# Patient Record
Sex: MALE | Race: WHITE | Employment: FULL TIME | URBAN - METROPOLITAN AREA
[De-identification: names, ages, dates, MRNs, and addresses within clinical notes are randomized per-mention and may not be internally consistent; named-entity substitution may affect disease eponyms.]

---

## 2017-08-18 ENCOUNTER — OFFICE VISIT (OUTPATIENT)
Dept: FAMILY MEDICINE CLINIC | Age: 42
End: 2017-08-18

## 2017-08-18 VITALS
HEART RATE: 76 BPM | DIASTOLIC BLOOD PRESSURE: 78 MMHG | BODY MASS INDEX: 34.56 KG/M2 | SYSTOLIC BLOOD PRESSURE: 106 MMHG | TEMPERATURE: 97.2 F | WEIGHT: 228 LBS | RESPIRATION RATE: 16 BRPM | HEIGHT: 68 IN

## 2017-08-18 DIAGNOSIS — E04.9 GOITER: ICD-10-CM

## 2017-08-18 DIAGNOSIS — Z00.00 HEALTHCARE MAINTENANCE: Primary | ICD-10-CM

## 2017-08-18 PROCEDURE — 99396 PREV VISIT EST AGE 40-64: CPT | Performed by: FAMILY MEDICINE

## 2017-08-18 ASSESSMENT — PATIENT HEALTH QUESTIONNAIRE - PHQ9
SUM OF ALL RESPONSES TO PHQ QUESTIONS 1-9: 0
1. LITTLE INTEREST OR PLEASURE IN DOING THINGS: 0
2. FEELING DOWN, DEPRESSED OR HOPELESS: 0
SUM OF ALL RESPONSES TO PHQ9 QUESTIONS 1 & 2: 0

## 2017-09-25 DIAGNOSIS — J02.9 ACUTE PHARYNGITIS, UNSPECIFIED ETIOLOGY: ICD-10-CM

## 2017-09-27 DIAGNOSIS — J02.0 STREP PHARYNGITIS: Primary | ICD-10-CM

## 2017-09-27 LAB
ORGANISM: ABNORMAL
THROAT CULTURE: ABNORMAL
THROAT CULTURE: ABNORMAL

## 2017-09-27 RX ORDER — AMOXICILLIN AND CLAVULANATE POTASSIUM 875; 125 MG/1; MG/1
1 TABLET, FILM COATED ORAL 2 TIMES DAILY
Qty: 14 TABLET | Refills: 0 | Status: SHIPPED | OUTPATIENT
Start: 2017-09-27 | End: 2017-10-04

## 2017-10-17 ENCOUNTER — HOSPITAL ENCOUNTER (OUTPATIENT)
Dept: ULTRASOUND IMAGING | Age: 42
Discharge: HOME | End: 2017-10-17
Payer: COMMERCIAL

## 2017-10-17 DIAGNOSIS — E04.9 GOITER: ICD-10-CM

## 2017-10-17 PROCEDURE — 76536 US EXAM OF HEAD AND NECK: CPT

## 2018-07-10 ENCOUNTER — OFFICE VISIT (OUTPATIENT)
Dept: FAMILY MEDICINE CLINIC | Age: 43
End: 2018-07-10
Payer: COMMERCIAL

## 2018-07-10 VITALS
OXYGEN SATURATION: 97 % | BODY MASS INDEX: 35.88 KG/M2 | TEMPERATURE: 96.5 F | DIASTOLIC BLOOD PRESSURE: 78 MMHG | HEART RATE: 74 BPM | SYSTOLIC BLOOD PRESSURE: 106 MMHG | WEIGHT: 236 LBS

## 2018-07-10 DIAGNOSIS — L50.9 URTICARIA: Primary | ICD-10-CM

## 2018-07-10 PROCEDURE — 1036F TOBACCO NON-USER: CPT | Performed by: NURSE PRACTITIONER

## 2018-07-10 PROCEDURE — G8417 CALC BMI ABV UP PARAM F/U: HCPCS | Performed by: NURSE PRACTITIONER

## 2018-07-10 PROCEDURE — G8427 DOCREV CUR MEDS BY ELIG CLIN: HCPCS | Performed by: NURSE PRACTITIONER

## 2018-07-10 PROCEDURE — 99213 OFFICE O/P EST LOW 20 MIN: CPT | Performed by: NURSE PRACTITIONER

## 2018-07-10 RX ORDER — PREDNISONE 20 MG/1
40 TABLET ORAL DAILY
Qty: 10 TABLET | Refills: 0 | Status: SHIPPED | OUTPATIENT
Start: 2018-07-10 | End: 2018-07-15

## 2018-07-10 ASSESSMENT — ENCOUNTER SYMPTOMS
DIARRHEA: 0
RHINORRHEA: 0
VOMITING: 0
COUGH: 0

## 2018-07-10 NOTE — PROGRESS NOTES
Subjective:      Patient ID: Ramon Lei is a 36 y.o. male who presents today with a complaint of   Chief Complaint   Patient presents with    Rash     x 2 months Pt states that he has a rash that is coming and going he states that it is on his arms legs and ankles. Pt has tried Benadryll with no relief. For 2 months has had itchy red blotches that come off and on. Worse in morning and at night. Goes away during the day. Benadryl did not help. Rash   This is a new problem. Episode onset: 2 months  The problem has been waxing and waning since onset. Location: arms and ankles  The rash is characterized by itchiness and redness. Pertinent negatives include no congestion, cough, diarrhea, fatigue, fever, rhinorrhea or vomiting. Treatments tried: benadryl        Past Medical History:   Diagnosis Date    Abdominal pain     Allergic rhinitis     Enterocolitis     Obstructive sleep apnea      No past surgical history on file. Family History   Problem Relation Age of Onset    Thyroid Cancer Mother     Diabetes Other     Alzheimer's Disease Other      Social History     Social History    Marital status:      Spouse name: N/A    Number of children: N/A    Years of education: N/A     Occupational History    Not on file. Social History Main Topics    Smoking status: Never Smoker    Smokeless tobacco: Never Used    Alcohol use Yes      Comment: occasional    Drug use: No    Sexual activity: Not on file     Other Topics Concern    Not on file     Social History Narrative    No narrative on file       Allergies:  Patient has no known allergies. Review of Systems   Constitutional: Negative for fatigue and fever. HENT: Negative for congestion and rhinorrhea. Respiratory: Negative for cough. Gastrointestinal: Negative for diarrhea and vomiting. Skin: Positive for rash.          Objective:   /78 (Site: Right Arm, Position: Sitting, Cuff Size: Medium Adult)   Pulse 74   Temp 96.5 °F (35.8 °C) (Temporal)   Wt 236 lb (107 kg)   SpO2 97%   BMI 35.88 kg/m²   Physical Exam   Constitutional: He appears well-developed and well-nourished. No distress. Cardiovascular: Normal rate, regular rhythm and normal heart sounds. No murmur heard. Pulmonary/Chest: Effort normal and breath sounds normal. No respiratory distress. Skin: No rash noted. He is not diaphoretic. There is currently no rash at time of exam      No results found for this visit on 07/10/18. Assessment:       Diagnosis Orders   1. Urticaria  predniSONE (DELTASONE) 20 MG tablet           Plan:      No orders of the defined types were placed in this encounter. Orders Placed This Encounter   Medications    predniSONE (DELTASONE) 20 MG tablet     Sig: Take 2 tablets by mouth daily for 5 days     Dispense:  10 tablet     Refill:  0     Patient does not currently have the rash and did not take pictures at home. Based on description, sounds like urticaria. Advised that something in his environment is causing this reaction. Will try short steroid course. Advised to keep notes at home of when rash occurs and what he was doing around that time. Advised to take photos. Follow up with PCP. Patient verbalized understanding. Return if symptoms worsen or fail to improve.     MANAV Whitman - CNP

## 2019-05-06 ENCOUNTER — TELEPHONE (OUTPATIENT)
Dept: FAMILY MEDICINE CLINIC | Age: 44
End: 2019-05-06

## 2019-06-16 ENCOUNTER — APPOINTMENT (OUTPATIENT)
Dept: CT IMAGING | Age: 44
End: 2019-06-16
Payer: COMMERCIAL

## 2019-06-16 ENCOUNTER — HOSPITAL ENCOUNTER (EMERGENCY)
Age: 44
Discharge: HOME | End: 2019-06-16
Payer: COMMERCIAL

## 2019-06-16 VITALS
HEART RATE: 71 BPM | DIASTOLIC BLOOD PRESSURE: 93 MMHG | OXYGEN SATURATION: 97 % | WEIGHT: 230 LBS | SYSTOLIC BLOOD PRESSURE: 138 MMHG | BODY MASS INDEX: 34.86 KG/M2 | TEMPERATURE: 97.9 F | RESPIRATION RATE: 16 BRPM | HEIGHT: 68 IN

## 2019-06-16 DIAGNOSIS — N20.0 NEPHROLITHIASIS: Primary | ICD-10-CM

## 2019-06-16 LAB
ALBUMIN SERPL-MCNC: 4.5 G/DL (ref 3.5–4.6)
ALP BLD-CCNC: 46 U/L (ref 35–104)
ALT SERPL-CCNC: 36 U/L (ref 0–41)
ANION GAP SERPL CALCULATED.3IONS-SCNC: 10 MEQ/L (ref 9–15)
AST SERPL-CCNC: 27 U/L (ref 0–40)
BACTERIA: NEGATIVE /HPF
BASOPHILS ABSOLUTE: 0.1 K/UL (ref 0–0.2)
BASOPHILS RELATIVE PERCENT: 0.9 %
BILIRUB SERPL-MCNC: 0.4 MG/DL (ref 0.2–0.7)
BILIRUBIN URINE: NEGATIVE
BLOOD, URINE: ABNORMAL
BUN BLDV-MCNC: 13 MG/DL (ref 6–20)
CALCIUM SERPL-MCNC: 9.7 MG/DL (ref 8.5–9.9)
CHLORIDE BLD-SCNC: 101 MEQ/L (ref 95–107)
CLARITY: CLEAR
CO2: 27 MEQ/L (ref 20–31)
COLOR: YELLOW
CREAT SERPL-MCNC: 1 MG/DL (ref 0.7–1.2)
EOSINOPHILS ABSOLUTE: 0.2 K/UL (ref 0–0.7)
EOSINOPHILS RELATIVE PERCENT: 2.2 %
EPITHELIAL CELLS, UA: ABNORMAL /HPF (ref 0–5)
GFR AFRICAN AMERICAN: >60
GFR NON-AFRICAN AMERICAN: >60
GLOBULIN: 2.8 G/DL (ref 2.3–3.5)
GLUCOSE BLD-MCNC: 104 MG/DL (ref 70–99)
GLUCOSE URINE: NEGATIVE MG/DL
HCT VFR BLD CALC: 40.6 % (ref 42–52)
HEMOGLOBIN: 14.8 G/DL (ref 14–18)
HYALINE CASTS: ABNORMAL /HPF (ref 0–5)
KETONES, URINE: NEGATIVE MG/DL
LACTIC ACID: 1 MMOL/L (ref 0.5–2.2)
LEUKOCYTE ESTERASE, URINE: NEGATIVE
LIPASE: 30 U/L (ref 12–95)
LYMPHOCYTES ABSOLUTE: 1.3 K/UL (ref 1–4.8)
LYMPHOCYTES RELATIVE PERCENT: 15.8 %
MCH RBC QN AUTO: 31.2 PG (ref 27–31.3)
MCHC RBC AUTO-ENTMCNC: 36.6 % (ref 33–37)
MCV RBC AUTO: 85.2 FL (ref 80–100)
MONOCYTES ABSOLUTE: 0.6 K/UL (ref 0.2–0.8)
MONOCYTES RELATIVE PERCENT: 7.7 %
NEUTROPHILS ABSOLUTE: 6.1 K/UL (ref 1.4–6.5)
NEUTROPHILS RELATIVE PERCENT: 73.4 %
NITRITE, URINE: NEGATIVE
PDW BLD-RTO: 13.3 % (ref 11.5–14.5)
PH UA: 8 (ref 5–9)
PLATELET # BLD: 270 K/UL (ref 130–400)
POTASSIUM SERPL-SCNC: 4.4 MEQ/L (ref 3.4–4.9)
PROTEIN UA: NEGATIVE MG/DL
RBC # BLD: 4.76 M/UL (ref 4.7–6.1)
RBC UA: ABNORMAL /HPF (ref 0–5)
SODIUM BLD-SCNC: 138 MEQ/L (ref 135–144)
SPECIFIC GRAVITY UA: 1.01 (ref 1–1.03)
TOTAL PROTEIN: 7.3 G/DL (ref 6.3–8)
URINE REFLEX TO CULTURE: YES
UROBILINOGEN, URINE: 0.2 E.U./DL
WBC # BLD: 8.3 K/UL (ref 4.8–10.8)
WBC UA: ABNORMAL /HPF (ref 0–5)

## 2019-06-16 PROCEDURE — 83690 ASSAY OF LIPASE: CPT

## 2019-06-16 PROCEDURE — 36415 COLL VENOUS BLD VENIPUNCTURE: CPT

## 2019-06-16 PROCEDURE — 2580000003 HC RX 258: Performed by: PHYSICIAN ASSISTANT

## 2019-06-16 PROCEDURE — 96374 THER/PROPH/DIAG INJ IV PUSH: CPT

## 2019-06-16 PROCEDURE — 85025 COMPLETE CBC W/AUTO DIFF WBC: CPT

## 2019-06-16 PROCEDURE — 96375 TX/PRO/DX INJ NEW DRUG ADDON: CPT

## 2019-06-16 PROCEDURE — 99283 EMERGENCY DEPT VISIT LOW MDM: CPT

## 2019-06-16 PROCEDURE — 6360000002 HC RX W HCPCS: Performed by: PHYSICIAN ASSISTANT

## 2019-06-16 PROCEDURE — 74176 CT ABD & PELVIS W/O CONTRAST: CPT

## 2019-06-16 PROCEDURE — 83605 ASSAY OF LACTIC ACID: CPT

## 2019-06-16 PROCEDURE — 80053 COMPREHEN METABOLIC PANEL: CPT

## 2019-06-16 PROCEDURE — 6370000000 HC RX 637 (ALT 250 FOR IP): Performed by: PHYSICIAN ASSISTANT

## 2019-06-16 PROCEDURE — 87086 URINE CULTURE/COLONY COUNT: CPT

## 2019-06-16 PROCEDURE — 81001 URINALYSIS AUTO W/SCOPE: CPT

## 2019-06-16 RX ORDER — ONDANSETRON 2 MG/ML
4 INJECTION INTRAMUSCULAR; INTRAVENOUS ONCE
Status: COMPLETED | OUTPATIENT
Start: 2019-06-16 | End: 2019-06-16

## 2019-06-16 RX ORDER — 0.9 % SODIUM CHLORIDE 0.9 %
1000 INTRAVENOUS SOLUTION INTRAVENOUS ONCE
Status: COMPLETED | OUTPATIENT
Start: 2019-06-16 | End: 2019-06-16

## 2019-06-16 RX ORDER — TAMSULOSIN HYDROCHLORIDE 0.4 MG/1
0.4 CAPSULE ORAL DAILY
Qty: 5 CAPSULE | Refills: 0 | Status: SHIPPED | OUTPATIENT
Start: 2019-06-16 | End: 2019-07-02 | Stop reason: ALTCHOICE

## 2019-06-16 RX ORDER — ONDANSETRON 4 MG/1
4 TABLET, FILM COATED ORAL EVERY 8 HOURS PRN
Qty: 12 TABLET | Refills: 0 | Status: SHIPPED | OUTPATIENT
Start: 2019-06-16 | End: 2019-06-28

## 2019-06-16 RX ORDER — OXYCODONE HYDROCHLORIDE AND ACETAMINOPHEN 5; 325 MG/1; MG/1
1 TABLET ORAL EVERY 6 HOURS PRN
Qty: 8 TABLET | Refills: 0 | Status: SHIPPED | OUTPATIENT
Start: 2019-06-16 | End: 2019-06-18

## 2019-06-16 RX ORDER — KETOROLAC TROMETHAMINE 15 MG/ML
15 INJECTION, SOLUTION INTRAMUSCULAR; INTRAVENOUS ONCE
Status: COMPLETED | OUTPATIENT
Start: 2019-06-16 | End: 2019-06-16

## 2019-06-16 RX ORDER — TAMSULOSIN HYDROCHLORIDE 0.4 MG/1
0.4 CAPSULE ORAL ONCE
Status: COMPLETED | OUTPATIENT
Start: 2019-06-16 | End: 2019-06-16

## 2019-06-16 RX ADMIN — KETOROLAC TROMETHAMINE 15 MG: 15 INJECTION, SOLUTION INTRAMUSCULAR; INTRAVENOUS at 15:22

## 2019-06-16 RX ADMIN — TAMSULOSIN HYDROCHLORIDE 0.4 MG: 0.4 CAPSULE ORAL at 16:45

## 2019-06-16 RX ADMIN — SODIUM CHLORIDE 1000 ML: 9 INJECTION, SOLUTION INTRAVENOUS at 15:22

## 2019-06-16 RX ADMIN — ONDANSETRON 4 MG: 2 INJECTION INTRAMUSCULAR; INTRAVENOUS at 15:22

## 2019-06-16 ASSESSMENT — ENCOUNTER SYMPTOMS
SORE THROAT: 0
EYE DISCHARGE: 0
RHINORRHEA: 0
VOMITING: 1
SHORTNESS OF BREATH: 0
COUGH: 0
COLOR CHANGE: 0
CONSTIPATION: 0
ABDOMINAL PAIN: 1
CHOKING: 0
ABDOMINAL DISTENTION: 0
BACK PAIN: 1
NAUSEA: 1

## 2019-06-16 ASSESSMENT — PAIN DESCRIPTION - DESCRIPTORS: DESCRIPTORS: ACHING

## 2019-06-16 ASSESSMENT — PAIN SCALES - GENERAL
PAINLEVEL_OUTOF10: 7
PAINLEVEL_OUTOF10: 7

## 2019-06-16 ASSESSMENT — PAIN DESCRIPTION - PAIN TYPE: TYPE: ACUTE PAIN

## 2019-06-16 ASSESSMENT — PAIN DESCRIPTION - LOCATION: LOCATION: BACK;GROIN

## 2019-06-16 ASSESSMENT — PAIN DESCRIPTION - FREQUENCY: FREQUENCY: INTERMITTENT

## 2019-06-16 NOTE — ED PROVIDER NOTES
[address] [facility] ED  eMERGENCY dEPARTMENT eNCOUnter      Pt Name: Laya Clark  MRN: [id number]  [place] 1975  Date of evaluation: 6/16/2019  Provider: Preethi Cosme PA-C    CHIEF COMPLAINT       Chief Complaint   Patient presents with    Groin Pain     pt c/o lower back pain that radiates into his groin, nausea, and vomiting that started today         HISTORY OF PRESENT ILLNESS   (Location/Symptom, Timing/Onset,Context/Setting, Quality, Duration, Modifying Factors, Severity)  Note limiting factors. Laya Clark is a 39 y.o. male who presents to the emergency department with complaint of low back pain right side abdominal pain right flank pain and groin pain which patient states been ongoing for the last 2 to 3 days. States it intermittently got worse this morning with an episode of emesis. States that this time pain has declined he rates as a 5 out of 10 he has no current nausea fevers no acute injury no numbness or tingling and no difficulty with urination no constipation or diarrhea. HPI    NursingNotes were reviewed. REVIEW OF SYSTEMS    (2-9 systems for level 4, 10 or more for level 5)     Review of Systems   Constitutional: Negative for activity change and appetite change. HENT: Negative for congestion, ear discharge, ear pain, nosebleeds, rhinorrhea and sore throat. Eyes: Negative for discharge. Respiratory: Negative for cough, choking and shortness of breath. Cardiovascular: Negative for chest pain, palpitations and leg swelling. Gastrointestinal: Positive for abdominal pain, nausea and vomiting. Negative for abdominal distention and constipation. Genitourinary: Positive for flank pain. Negative for decreased urine volume, difficulty urinating, discharge, dysuria, frequency and urgency. Musculoskeletal: Positive for back pain. Negative for arthralgias, myalgias, neck pain and neck stiffness. Skin: Negative for color change, pallor, rash and wound. Neurological: Negative for dizziness, tremors, syncope, weakness, numbness and headaches. Psychiatric/Behavioral: Negative for agitation and confusion. Except as noted above the remainder of the review of systems was reviewed and negative. PAST MEDICAL HISTORY     Past Medical History:   Diagnosis Date    Abdominal pain     Allergic rhinitis     Enterocolitis     Obstructive sleep apnea          SURGICALHISTORY     History reviewed. No pertinent surgical history. CURRENT MEDICATIONS       Previous Medications    CETIRIZINE (ZYRTEC) 10 MG TABLET    Take 1 tablet by mouth daily    FLUTICASONE (FLONASE) 50 MCG/ACT NASAL SPRAY    2 sprays by Nasal route daily    THERAPEUTIC MULTIVITAMIN-MINERALS (THERAGRAN-M) TABLET    Take 1 tablet by mouth daily. ALLERGIES     Patient has no known allergies.     FAMILY HISTORY       Family History   Problem Relation Age of Onset    Thyroid Cancer Mother     Diabetes Other     Alzheimer's Disease Other           SOCIAL HISTORY       Social History     Socioeconomic History    Marital status:      Spouse name: None    Number of children: None    Years of education: None    Highest education level: None   Occupational History    None   Social Needs    Financial resource strain: None    Food insecurity:     Worry: None     Inability: None    Transportation needs:     Medical: None     Non-medical: None   Tobacco Use    Smoking status: Never Smoker    Smokeless tobacco: Never Used   Substance and Sexual Activity    Alcohol use: Yes     Comment: occasional    Drug use: No    Sexual activity: None   Lifestyle    Physical activity:     Days per week: None     Minutes per session: None    Stress: None   Relationships    Social connections:     Talks on phone: None     Gets together: None     Attends Protestant service: None     Active member of club or organization: None     Attends meetings of clubs or organizations: None     Relationship status: None    Intimate partner violence:     Fear of current or ex partner: None     Emotionally abused: None     Physically abused: None     Forced sexual activity: None   Other Topics Concern    None   Social History Narrative    None       SCREENINGS    [place] Coma Scale  Eye Opening: Spontaneous  Best Verbal Response: Oriented  Best Motor Response: Obeys commands  Nickie Coma Scale Score: 15 @FLOW([id number])@      PHYSICAL EXAM    (up to 7 for level 4, 8 or more for level 5)     ED Triage Vitals [06/16/19 1424]   BP Temp Temp Source Pulse Resp SpO2 Height Weight   (!) 138/93 97.9 °F (36.6 °C) Oral 71 16 97 % 5' 8\" (1.727 m) 230 lb (104.3 kg)       Physical Exam   Constitutional: He is oriented to person, place, and time. He appears well-developed and well-nourished. HENT:   Head: Normocephalic. Eyes: Pupils are equal, round, and reactive to light. EOM are normal.   Neck: Normal range of motion. Neck supple. No JVD present. No tracheal deviation present. Cardiovascular: Normal rate. Pulmonary/Chest: Effort normal and breath sounds normal. No respiratory distress. He has no wheezes. He has no rales. He exhibits no tenderness. Abdominal: Soft. Bowel sounds are normal. He exhibits no distension and no mass. There is no tenderness. There is no guarding. Musculoskeletal: Normal range of motion. He exhibits no edema or deformity. No CVA tenderness   Neurological: He is alert and oriented to person, place, and time. Coordination normal.   Skin: Skin is warm.        DIAGNOSTIC RESULTS     EKG: All EKG's are interpreted by the Emergency Department Physician who either signs or Co-signsthis chart in the absence of a cardiologist.        RADIOLOGY:   Nuriaelee Maddie such as CT, Ultrasound and MRI are read by the radiologist. Marisela Olson radiographic images are visualized and preliminarily interpreted by the emergency physician with the below findings:    CT scan of the abdomen pelvis shows a 2 mm stone distal third of right ureter with mild hydronephrosis. Interpretation per the Radiologist below, if available at the time ofthis note:    CT ABDOMEN PELVIS WO CONTRAST Additional Contrast? None   Final Result   2 mm stone distal third of right ureter with mild hydronephrosis. ED BEDSIDE ULTRASOUND:   Performed by ED Physician - none    LABS:  Labs Reviewed   COMPREHENSIVE METABOLIC PANEL - Abnormal; Notable for the following components:       Result Value    Glucose 104 (*)     All other components within normal limits   CBC WITH AUTO DIFFERENTIAL - Abnormal; Notable for the following components:    Hematocrit 40.6 (*)     All other components within normal limits   URINE RT REFLEX TO CULTURE - Abnormal; Notable for the following components:    Blood, Urine TRACE (*)     All other components within normal limits   MICROSCOPIC URINALYSIS - Abnormal; Notable for the following components:    RBC, UA 6-10 (*)     All other components within normal limits   URINE CULTURE   LIPASE   LACTIC ACID, PLASMA       All other labs were within normal range or not returned as of this dictation. EMERGENCY DEPARTMENT COURSE and DIFFERENTIAL DIAGNOSIS/MDM:   Vitals:    Vitals:    06/16/19 1424   BP: (!) 138/93   Pulse: 71   Resp: 16   Temp: 97.9 °F (36.6 °C)   TempSrc: Oral   SpO2: 97%   Weight: 230 lb (104.3 kg)   Height: 5' 8\" (1.727 m)          MDM  Number of Diagnoses or Management Options  Nephrolithiasis:   Diagnosis management comments: Patient medicated with Zofran Toradol and given liter of normal saline he is feeling much better at this time CT scan of the abdomen pelvis shows a 2 mm stone at the area of the UP junction causing mild hydronephrosis. The labs are unimpressive this time no signs of urinary tract infection. Patient was advised to follow-up with his regular family physician he was also given a referral off to urology with Dr. Black Billingsley, which is recommended for 4-day follow-up.   She was advised if he has any worsening symptoms increasing pain fever nausea vomiting difficulty with urination or hematuria to return to ER for reevaluation. CRITICAL CARE TIME   Total Critical Care time was 0 minutes, excluding separately reportableprocedures. There was a high probability of clinicallysignificant/life threatening deterioration in the patient's condition which required my urgent intervention. CONSULTS:  None    PROCEDURES:  Unless otherwise noted below, none     Procedures    FINAL IMPRESSION      1. Nephrolithiasis          DISPOSITION/PLAN   DISPOSITION Decision To Discharge 06/16/2019 04:33:23 PM      PATIENT REFERRED TO:  Erasmo Nicholson MD  [facility] [id number]  [phone]    In 3 days      Eugenia Ch MD  [address] [address] [phone]            DISCHARGE MEDICATIONS:  New Prescriptions    ONDANSETRON (ZOFRAN) 4 MG TABLET    Take 1 tablet by mouth every 8 hours as needed for Nausea    OXYCODONE-ACETAMINOPHEN (PERCOCET) 5-325 MG PER TABLET    Take 1 tablet by mouth every 6 hours as needed for Pain for up to 2 days. Intended supply: 3 days.  Take lowest dose possible to manage pain    TAMSULOSIN (FLOMAX) 0.4 MG CAPSULE    Take 1 capsule by mouth daily for 5 doses          (Please note that portions of this note were completed with a voice recognition program.  Efforts were made to edit the dictations but occasionally words are mis-transcribed.)    Romayne Carol, PA-C (electronically signed)  Attending Emergency Physician       Romayne Carol, PA-C  06/16/19 [id number]

## 2019-06-16 NOTE — ED TRIAGE NOTES
Pt c/o lower back pain that radiates into his groin, nausea, and vomiting, Pt is A&OX3, calm, ambulatory, afebrile, breathes are equal and unlabored.

## 2019-06-18 LAB — URINE CULTURE, ROUTINE: NORMAL

## 2019-07-02 ENCOUNTER — OFFICE VISIT (OUTPATIENT)
Dept: UROLOGY | Age: 44
End: 2019-07-02
Payer: COMMERCIAL

## 2019-07-02 VITALS
HEART RATE: 55 BPM | WEIGHT: 227 LBS | HEIGHT: 68 IN | BODY MASS INDEX: 34.4 KG/M2 | SYSTOLIC BLOOD PRESSURE: 116 MMHG | DIASTOLIC BLOOD PRESSURE: 84 MMHG

## 2019-07-02 DIAGNOSIS — N20.0 KIDNEY STONE: Primary | ICD-10-CM

## 2019-07-02 LAB
BILIRUBIN, POC: ABNORMAL
BLOOD URINE, POC: ABNORMAL
CLARITY, POC: CLEAR
COLOR, POC: YELLOW
GLUCOSE URINE, POC: ABNORMAL
KETONES, POC: ABNORMAL
LEUKOCYTE EST, POC: ABNORMAL
NITRITE, POC: ABNORMAL
PH, POC: 5
PROTEIN, POC: ABNORMAL
SPECIFIC GRAVITY, POC: >=1.03
UROBILINOGEN, POC: 0.2

## 2019-07-02 PROCEDURE — G8417 CALC BMI ABV UP PARAM F/U: HCPCS | Performed by: UROLOGY

## 2019-07-02 PROCEDURE — 99243 OFF/OP CNSLTJ NEW/EST LOW 30: CPT | Performed by: UROLOGY

## 2019-07-02 PROCEDURE — G8427 DOCREV CUR MEDS BY ELIG CLIN: HCPCS | Performed by: UROLOGY

## 2019-07-02 PROCEDURE — 81003 URINALYSIS AUTO W/O SCOPE: CPT | Performed by: UROLOGY

## 2019-07-03 ENCOUNTER — APPOINTMENT (OUTPATIENT)
Dept: CT IMAGING | Age: 44
End: 2019-07-03
Payer: COMMERCIAL

## 2019-07-03 ENCOUNTER — HOSPITAL ENCOUNTER (EMERGENCY)
Age: 44
Discharge: HOME | End: 2019-07-03
Payer: COMMERCIAL

## 2019-07-03 VITALS
BODY MASS INDEX: 34.56 KG/M2 | RESPIRATION RATE: 18 BRPM | DIASTOLIC BLOOD PRESSURE: 84 MMHG | SYSTOLIC BLOOD PRESSURE: 145 MMHG | OXYGEN SATURATION: 96 % | HEIGHT: 68 IN | HEART RATE: 51 BPM | TEMPERATURE: 97.7 F | WEIGHT: 228 LBS

## 2019-07-03 DIAGNOSIS — N20.0 NEPHROLITHIASIS: Primary | ICD-10-CM

## 2019-07-03 LAB
ALBUMIN SERPL-MCNC: 4.6 G/DL (ref 3.5–4.6)
ALP BLD-CCNC: 51 U/L (ref 35–104)
ALT SERPL-CCNC: 26 U/L (ref 0–41)
ANION GAP SERPL CALCULATED.3IONS-SCNC: 14 MEQ/L (ref 9–15)
AST SERPL-CCNC: 25 U/L (ref 0–40)
BACTERIA: NEGATIVE /HPF
BASOPHILS ABSOLUTE: 0 K/UL (ref 0–0.2)
BASOPHILS RELATIVE PERCENT: 0.6 %
BILIRUB SERPL-MCNC: 0.7 MG/DL (ref 0.2–0.7)
BILIRUBIN URINE: NEGATIVE
BLOOD, URINE: ABNORMAL
BUN BLDV-MCNC: 16 MG/DL (ref 6–20)
CALCIUM SERPL-MCNC: 9.5 MG/DL (ref 8.5–9.9)
CHLORIDE BLD-SCNC: 102 MEQ/L (ref 95–107)
CLARITY: ABNORMAL
CO2: 26 MEQ/L (ref 20–31)
COLOR: ABNORMAL
CREAT SERPL-MCNC: 1.37 MG/DL (ref 0.7–1.2)
CRYSTALS, UA: NORMAL
EOSINOPHILS ABSOLUTE: 0.2 K/UL (ref 0–0.7)
EOSINOPHILS RELATIVE PERCENT: 3.7 %
EPITHELIAL CELLS, UA: NORMAL /HPF (ref 0–5)
GFR AFRICAN AMERICAN: >60
GFR NON-AFRICAN AMERICAN: 56.3
GLOBULIN: 2.8 G/DL (ref 2.3–3.5)
GLUCOSE BLD-MCNC: 142 MG/DL (ref 70–99)
GLUCOSE URINE: NEGATIVE MG/DL
HCT VFR BLD CALC: 40.8 % (ref 42–52)
HEMOGLOBIN: 14.5 G/DL (ref 14–18)
KETONES, URINE: ABNORMAL MG/DL
LEUKOCYTE ESTERASE, URINE: NEGATIVE
LYMPHOCYTES ABSOLUTE: 1.7 K/UL (ref 1–4.8)
LYMPHOCYTES RELATIVE PERCENT: 27 %
MCH RBC QN AUTO: 30 PG (ref 27–31.3)
MCHC RBC AUTO-ENTMCNC: 35.4 % (ref 33–37)
MCV RBC AUTO: 84.6 FL (ref 80–100)
MONOCYTES ABSOLUTE: 0.5 K/UL (ref 0.2–0.8)
MONOCYTES RELATIVE PERCENT: 8.6 %
NEUTROPHILS ABSOLUTE: 3.7 K/UL (ref 1.4–6.5)
NEUTROPHILS RELATIVE PERCENT: 60.1 %
NITRITE, URINE: NEGATIVE
PDW BLD-RTO: 13.7 % (ref 11.5–14.5)
PH UA: 5 (ref 5–9)
PLATELET # BLD: 254 K/UL (ref 130–400)
POTASSIUM SERPL-SCNC: 4 MEQ/L (ref 3.4–4.9)
PROTEIN UA: ABNORMAL MG/DL
RBC # BLD: 4.82 M/UL (ref 4.7–6.1)
SODIUM BLD-SCNC: 142 MEQ/L (ref 135–144)
SPECIFIC GRAVITY UA: 1.04 (ref 1–1.03)
TOTAL PROTEIN: 7.4 G/DL (ref 6.3–8)
URINE REFLEX TO CULTURE: YES
UROBILINOGEN, URINE: 1 E.U./DL
WBC # BLD: 6.2 K/UL (ref 4.8–10.8)
WBC UA: NORMAL /HPF (ref 0–5)

## 2019-07-03 PROCEDURE — 87086 URINE CULTURE/COLONY COUNT: CPT

## 2019-07-03 PROCEDURE — 85025 COMPLETE CBC W/AUTO DIFF WBC: CPT

## 2019-07-03 PROCEDURE — 6360000002 HC RX W HCPCS: Performed by: PHYSICIAN ASSISTANT

## 2019-07-03 PROCEDURE — 80053 COMPREHEN METABOLIC PANEL: CPT

## 2019-07-03 PROCEDURE — 81001 URINALYSIS AUTO W/SCOPE: CPT

## 2019-07-03 PROCEDURE — 36415 COLL VENOUS BLD VENIPUNCTURE: CPT

## 2019-07-03 PROCEDURE — 96374 THER/PROPH/DIAG INJ IV PUSH: CPT

## 2019-07-03 PROCEDURE — 2580000003 HC RX 258: Performed by: PHYSICIAN ASSISTANT

## 2019-07-03 PROCEDURE — 96375 TX/PRO/DX INJ NEW DRUG ADDON: CPT

## 2019-07-03 PROCEDURE — 74176 CT ABD & PELVIS W/O CONTRAST: CPT

## 2019-07-03 PROCEDURE — 99284 EMERGENCY DEPT VISIT MOD MDM: CPT

## 2019-07-03 RX ORDER — TAMSULOSIN HYDROCHLORIDE 0.4 MG/1
0.4 CAPSULE ORAL DAILY
COMMUNITY
End: 2019-07-03

## 2019-07-03 RX ORDER — KETOROLAC TROMETHAMINE 15 MG/ML
15 INJECTION, SOLUTION INTRAMUSCULAR; INTRAVENOUS ONCE
Status: COMPLETED | OUTPATIENT
Start: 2019-07-03 | End: 2019-07-03

## 2019-07-03 RX ORDER — KETOROLAC TROMETHAMINE 10 MG/1
TABLET, FILM COATED ORAL
Qty: 20 TABLET | Refills: 0 | Status: SHIPPED | OUTPATIENT
Start: 2019-07-03 | End: 2020-01-08

## 2019-07-03 RX ORDER — OXYCODONE HYDROCHLORIDE AND ACETAMINOPHEN 5; 325 MG/1; MG/1
1 TABLET ORAL EVERY 6 HOURS PRN
COMMUNITY
End: 2019-07-03

## 2019-07-03 RX ORDER — TAMSULOSIN HYDROCHLORIDE 0.4 MG/1
0.4 CAPSULE ORAL DAILY
Qty: 5 CAPSULE | Refills: 0 | Status: SHIPPED | OUTPATIENT
Start: 2019-07-03 | End: 2019-07-22 | Stop reason: ALTCHOICE

## 2019-07-03 RX ORDER — ONDANSETRON 4 MG/1
4 TABLET, ORALLY DISINTEGRATING ORAL EVERY 8 HOURS PRN
Qty: 12 TABLET | Refills: 0 | Status: SHIPPED | OUTPATIENT
Start: 2019-07-03 | End: 2019-07-22

## 2019-07-03 RX ORDER — 0.9 % SODIUM CHLORIDE 0.9 %
1000 INTRAVENOUS SOLUTION INTRAVENOUS ONCE
Status: COMPLETED | OUTPATIENT
Start: 2019-07-03 | End: 2019-07-03

## 2019-07-03 RX ORDER — ONDANSETRON 4 MG/1
4 TABLET, FILM COATED ORAL EVERY 8 HOURS PRN
COMMUNITY
End: 2021-07-26

## 2019-07-03 RX ORDER — OXYCODONE HYDROCHLORIDE AND ACETAMINOPHEN 5; 325 MG/1; MG/1
1 TABLET ORAL EVERY 6 HOURS PRN
Qty: 12 TABLET | Refills: 0 | Status: SHIPPED | OUTPATIENT
Start: 2019-07-03 | End: 2019-07-06

## 2019-07-03 RX ORDER — ONDANSETRON 2 MG/ML
4 INJECTION INTRAMUSCULAR; INTRAVENOUS ONCE
Status: COMPLETED | OUTPATIENT
Start: 2019-07-03 | End: 2019-07-03

## 2019-07-03 RX ADMIN — KETOROLAC TROMETHAMINE 15 MG: 15 INJECTION, SOLUTION INTRAMUSCULAR; INTRAVENOUS at 09:35

## 2019-07-03 RX ADMIN — ONDANSETRON 4 MG: 2 INJECTION INTRAMUSCULAR; INTRAVENOUS at 09:35

## 2019-07-03 RX ADMIN — SODIUM CHLORIDE 1000 ML: 9 INJECTION, SOLUTION INTRAVENOUS at 09:35

## 2019-07-03 ASSESSMENT — ENCOUNTER SYMPTOMS
COLOR CHANGE: 0
DIARRHEA: 0
NAUSEA: 1
SHORTNESS OF BREATH: 0
RHINORRHEA: 0
BACK PAIN: 0
ABDOMINAL DISTENTION: 0
ABDOMINAL PAIN: 1
EYE DISCHARGE: 0
SORE THROAT: 0
CONSTIPATION: 0
VOMITING: 1

## 2019-07-03 ASSESSMENT — PAIN SCALES - GENERAL
PAINLEVEL_OUTOF10: 9
PAINLEVEL_OUTOF10: 8

## 2019-07-03 ASSESSMENT — PAIN DESCRIPTION - PROGRESSION: CLINICAL_PROGRESSION: GRADUALLY WORSENING

## 2019-07-03 ASSESSMENT — PAIN DESCRIPTION - ONSET: ONSET: ON-GOING

## 2019-07-03 ASSESSMENT — PAIN DESCRIPTION - ORIENTATION: ORIENTATION: RIGHT

## 2019-07-03 ASSESSMENT — PAIN DESCRIPTION - PAIN TYPE: TYPE: ACUTE PAIN

## 2019-07-03 ASSESSMENT — PAIN DESCRIPTION - FREQUENCY: FREQUENCY: INTERMITTENT

## 2019-07-03 ASSESSMENT — PAIN DESCRIPTION - LOCATION: LOCATION: ABDOMEN;BACK

## 2019-07-03 NOTE — ED PROVIDER NOTES
pelvis shows mild right hydronephrosis and right hydroureter level of right UVJ where a 1.7 m calculus is demonstrated. Interpretation per the Radiologist below, if available at the time ofthis note:    CT ABDOMEN PELVIS WO CONTRAST Additional Contrast? None   Final Result      Mild right hydronephrosis, and right hydroureter, the level of right ureterovesical junction, where 1.7 mm calculus is demonstrated. All CT scans at this facility use dose modulation, iterative reconstruction, and/or weight based dosing when appropriate to reduce radiation dose to as low as reasonably achievable. ED BEDSIDE ULTRASOUND:   Performed by ED Physician - none    LABS:  Labs Reviewed   COMPREHENSIVE METABOLIC PANEL - Abnormal; Notable for the following components:       Result Value    Glucose 142 (*)     CREATININE 1.37 (*)     GFR Non- 56.3 (*)     All other components within normal limits   CBC WITH AUTO DIFFERENTIAL - Abnormal; Notable for the following components:    Hematocrit 40.8 (*)     All other components within normal limits   URINE RT REFLEX TO CULTURE - Abnormal; Notable for the following components:    Color, UA DARK YELLOW (*)     Clarity, UA TURBID (*)     Ketones, Urine TRACE (*)     Blood, Urine MODERATE (*)     Protein, UA TRACE (*)     All other components within normal limits   URINE CULTURE   MICROSCOPIC URINALYSIS       All other labs were within normal range or not returned as of this dictation.     EMERGENCY DEPARTMENT COURSE and DIFFERENTIAL DIAGNOSIS/MDM:   Vitals:    Vitals:    07/03/19 0906 07/03/19 1028   BP: (!) 140/98    Pulse: 59 62   Resp: 20 18   Temp: 97.7 °F (36.5 °C)    TempSrc: Oral    SpO2: 98% 99%   Weight: 228 lb (103.4 kg)    Height: 5' 8\" (1.727 m)           MDM  Number of Diagnoses or Management Options  Nephrolithiasis:   Diagnosis management comments: Patient presents emerged from today with complaint of right flank pain which is been

## 2019-07-04 LAB — URINE CULTURE, ROUTINE: NORMAL

## 2019-07-17 ENCOUNTER — TELEPHONE (OUTPATIENT)
Dept: UROLOGY | Age: 44
End: 2019-07-17

## 2019-07-17 DIAGNOSIS — N20.0 KIDNEY STONE: Primary | ICD-10-CM

## 2019-07-19 ENCOUNTER — OFFICE VISIT (OUTPATIENT)
Dept: FAMILY MEDICINE CLINIC | Age: 44
End: 2019-07-19
Payer: COMMERCIAL

## 2019-07-19 VITALS
HEART RATE: 86 BPM | HEIGHT: 68 IN | BODY MASS INDEX: 34.56 KG/M2 | WEIGHT: 228 LBS | TEMPERATURE: 98.2 F | RESPIRATION RATE: 16 BRPM | OXYGEN SATURATION: 99 %

## 2019-07-19 DIAGNOSIS — H10.32 ACUTE BACTERIAL CONJUNCTIVITIS OF LEFT EYE: Primary | ICD-10-CM

## 2019-07-19 DIAGNOSIS — J30.2 SEASONAL ALLERGIES: ICD-10-CM

## 2019-07-19 PROCEDURE — G8417 CALC BMI ABV UP PARAM F/U: HCPCS | Performed by: NURSE PRACTITIONER

## 2019-07-19 PROCEDURE — G8427 DOCREV CUR MEDS BY ELIG CLIN: HCPCS | Performed by: NURSE PRACTITIONER

## 2019-07-19 PROCEDURE — 1036F TOBACCO NON-USER: CPT | Performed by: NURSE PRACTITIONER

## 2019-07-19 PROCEDURE — 99213 OFFICE O/P EST LOW 20 MIN: CPT | Performed by: NURSE PRACTITIONER

## 2019-07-19 RX ORDER — FLUTICASONE PROPIONATE 50 MCG
1 SPRAY, SUSPENSION (ML) NASAL DAILY
Qty: 1 BOTTLE | Refills: 0 | Status: SHIPPED | OUTPATIENT
Start: 2019-07-19 | End: 2021-07-26

## 2019-07-19 RX ORDER — POLYMYXIN B SULFATE AND TRIMETHOPRIM 1; 10000 MG/ML; [USP'U]/ML
1 SOLUTION OPHTHALMIC EVERY 4 HOURS
Qty: 1 BOTTLE | Refills: 0 | Status: SHIPPED | OUTPATIENT
Start: 2019-07-19 | End: 2019-07-26

## 2019-07-19 ASSESSMENT — PATIENT HEALTH QUESTIONNAIRE - PHQ9
2. FEELING DOWN, DEPRESSED OR HOPELESS: 0
SUM OF ALL RESPONSES TO PHQ QUESTIONS 1-9: 0
1. LITTLE INTEREST OR PLEASURE IN DOING THINGS: 0
SUM OF ALL RESPONSES TO PHQ9 QUESTIONS 1 & 2: 0
SUM OF ALL RESPONSES TO PHQ QUESTIONS 1-9: 0

## 2019-07-19 ASSESSMENT — ENCOUNTER SYMPTOMS
DIARRHEA: 0
COUGH: 0
EYE DISCHARGE: 1
SORE THROAT: 0
RHINORRHEA: 1
WHEEZING: 0
NAUSEA: 0
COLOR CHANGE: 0
SHORTNESS OF BREATH: 0
EYE PAIN: 1
EYE REDNESS: 1
VOMITING: 0

## 2019-07-22 ENCOUNTER — OFFICE VISIT (OUTPATIENT)
Dept: UROLOGY | Age: 44
End: 2019-07-22
Payer: COMMERCIAL

## 2019-07-22 ENCOUNTER — HOSPITAL ENCOUNTER (OUTPATIENT)
Dept: GENERAL RADIOLOGY | Age: 44
Discharge: HOME | End: 2019-07-24
Payer: COMMERCIAL

## 2019-07-22 VITALS
HEART RATE: 87 BPM | HEIGHT: 68 IN | DIASTOLIC BLOOD PRESSURE: 82 MMHG | SYSTOLIC BLOOD PRESSURE: 120 MMHG | BODY MASS INDEX: 34.86 KG/M2 | WEIGHT: 230 LBS

## 2019-07-22 DIAGNOSIS — N20.0 KIDNEY STONE: Primary | ICD-10-CM

## 2019-07-22 DIAGNOSIS — N20.0 KIDNEY STONE: ICD-10-CM

## 2019-07-22 LAB
BILIRUBIN, POC: ABNORMAL
BLOOD URINE, POC: ABNORMAL
CLARITY, POC: CLEAR
COLOR, POC: YELLOW
GLUCOSE URINE, POC: ABNORMAL
KETONES, POC: ABNORMAL
LEUKOCYTE EST, POC: ABNORMAL
NITRITE, POC: ABNORMAL
PH, POC: 5.5
PROTEIN, POC: ABNORMAL
SPECIFIC GRAVITY, POC: >=1.03
UROBILINOGEN, POC: 0.2

## 2019-07-22 PROCEDURE — G8427 DOCREV CUR MEDS BY ELIG CLIN: HCPCS | Performed by: UROLOGY

## 2019-07-22 PROCEDURE — 74018 RADEX ABDOMEN 1 VIEW: CPT

## 2019-07-22 PROCEDURE — 81003 URINALYSIS AUTO W/O SCOPE: CPT | Performed by: UROLOGY

## 2019-07-22 PROCEDURE — 1036F TOBACCO NON-USER: CPT | Performed by: UROLOGY

## 2019-07-22 PROCEDURE — G8417 CALC BMI ABV UP PARAM F/U: HCPCS | Performed by: UROLOGY

## 2019-07-22 PROCEDURE — 99213 OFFICE O/P EST LOW 20 MIN: CPT | Performed by: UROLOGY

## 2019-09-26 ENCOUNTER — OFFICE VISIT (OUTPATIENT)
Dept: FAMILY MEDICINE CLINIC | Age: 44
End: 2019-09-26
Payer: COMMERCIAL

## 2019-09-26 VITALS
OXYGEN SATURATION: 97 % | HEART RATE: 94 BPM | WEIGHT: 239.6 LBS | SYSTOLIC BLOOD PRESSURE: 130 MMHG | BODY MASS INDEX: 36.43 KG/M2 | DIASTOLIC BLOOD PRESSURE: 82 MMHG | TEMPERATURE: 96.9 F

## 2019-09-26 DIAGNOSIS — R42 VERTIGO: ICD-10-CM

## 2019-09-26 DIAGNOSIS — R41.3 MEMORY LOSS: Primary | ICD-10-CM

## 2019-09-26 PROCEDURE — 1036F TOBACCO NON-USER: CPT | Performed by: FAMILY MEDICINE

## 2019-09-26 PROCEDURE — 99213 OFFICE O/P EST LOW 20 MIN: CPT | Performed by: FAMILY MEDICINE

## 2019-09-26 PROCEDURE — G8417 CALC BMI ABV UP PARAM F/U: HCPCS | Performed by: FAMILY MEDICINE

## 2019-09-26 PROCEDURE — G8427 DOCREV CUR MEDS BY ELIG CLIN: HCPCS | Performed by: FAMILY MEDICINE

## 2019-09-26 RX ORDER — TRAZODONE HYDROCHLORIDE 50 MG/1
50 TABLET ORAL NIGHTLY
Qty: 90 TABLET | Refills: 1 | Status: SHIPPED | OUTPATIENT
Start: 2019-09-26 | End: 2021-07-26

## 2019-09-26 ASSESSMENT — ENCOUNTER SYMPTOMS
GASTROINTESTINAL NEGATIVE: 1
ALLERGIC/IMMUNOLOGIC NEGATIVE: 1
SHORTNESS OF BREATH: 0
RESPIRATORY NEGATIVE: 1
EYES NEGATIVE: 1

## 2019-09-26 NOTE — PROGRESS NOTES
Patient is seen in follow up for   Chief Complaint   Patient presents with   [facility] Establish Care     Pt. is here today to establish care, previous pcp was Dr. Leann Solis. states he had a flu vaccine already     HPIHere to establish feeling well. Here with sleep apnea and vertigo. Past Medical History:   Diagnosis Date    Abdominal pain     Allergic rhinitis     Enterocolitis     Obstructive sleep apnea      There are no active problems to display for this patient. No past surgical history on file.   Family History   Problem Relation Age of Onset    Thyroid Cancer Mother     Diabetes Other     Alzheimer's Disease Other      Social History     Socioeconomic History    Marital status:      Spouse name: None    Number of children: None    Years of education: None    Highest education level: None   Occupational History    None   Social Needs    Financial resource strain: None    Food insecurity:     Worry: None     Inability: None    Transportation needs:     Medical: None     Non-medical: None   Tobacco Use    Smoking status: Never Smoker    Smokeless tobacco: Never Used   Substance and Sexual Activity    Alcohol use: Yes     Comment: occasional    Drug use: No    Sexual activity: None   Lifestyle    Physical activity:     Days per week: None     Minutes per session: None    Stress: None   Relationships    Social connections:     Talks on phone: None     Gets together: None     Attends Latter day service: None     Active member of club or organization: None     Attends meetings of clubs or organizations: None     Relationship status: None    Intimate partner violence:     Fear of current or ex partner: None     Emotionally abused: None     Physically abused: None     Forced sexual activity: None   Other Topics Concern    None   Social History Narrative    None     Current Outpatient Medications   Medication Sig Dispense Refill    traZODone (DESYREL) 50 MG tablet Take 1 tablet by mouth nightly 90 tablet 1    diphenhydrAMINE HCl (BENADRYL ALLERGY PO) Take by mouth daily Indications: for Poison Ivy      fluticasone (FLONASE) 50 MCG/ACT nasal spray 1 spray by Nasal route daily 1 Bottle 0    cetirizine (ZYRTEC) 10 MG tablet Take 1 tablet by mouth daily 30 tablet 3    therapeutic multivitamin-minerals (THERAGRAN-M) tablet Take 1 tablet by mouth daily.  ondansetron (ZOFRAN) 4 MG tablet Take 4 mg by mouth every 8 hours as needed for Nausea or Vomiting      ketorolac (TORADOL) 10 MG tablet 1 tablet Every 6 hours as needed for pain control, do not take more than 4 times per days. (Patient not taking: Reported on 9/26/2019) 20 tablet 0     No current facility-administered medications for this visit. Current Outpatient Medications on File Prior to Visit   Medication Sig Dispense Refill    diphenhydrAMINE HCl (BENADRYL ALLERGY PO) Take by mouth daily Indications: for Poison Ivy      fluticasone (FLONASE) 50 MCG/ACT nasal spray 1 spray by Nasal route daily 1 Bottle 0    cetirizine (ZYRTEC) 10 MG tablet Take 1 tablet by mouth daily 30 tablet 3    therapeutic multivitamin-minerals (THERAGRAN-M) tablet Take 1 tablet by mouth daily.  ondansetron (ZOFRAN) 4 MG tablet Take 4 mg by mouth every 8 hours as needed for Nausea or Vomiting      ketorolac (TORADOL) 10 MG tablet 1 tablet Every 6 hours as needed for pain control, do not take more than 4 times per days. (Patient not taking: Reported on 9/26/2019) 20 tablet 0     No current facility-administered medications on file prior to visit.       No Known Allergies  Health Maintenance   Topic Date Due    HIV screen  12/27/1989    DTaP/Tdap/Td vaccine (1 - Tdap) 12/27/1993    Lipid screen  12/27/2014    Diabetes screen  12/27/2014    Flu vaccine (1) 09/01/2019    Pneumococcal 0-64 years Vaccine  Aged Out       Review of Systems     Review of Systems   Constitutional: Negative for activity change, appetite change,

## 2019-10-30 ENCOUNTER — OFFICE VISIT (OUTPATIENT)
Dept: FAMILY MEDICINE CLINIC | Age: 44
End: 2019-10-30
Payer: COMMERCIAL

## 2019-10-30 VITALS
RESPIRATION RATE: 18 BRPM | WEIGHT: 240 LBS | SYSTOLIC BLOOD PRESSURE: 120 MMHG | HEART RATE: 74 BPM | BODY MASS INDEX: 36.37 KG/M2 | HEIGHT: 68 IN | TEMPERATURE: 96.4 F | OXYGEN SATURATION: 97 % | DIASTOLIC BLOOD PRESSURE: 70 MMHG

## 2019-10-30 DIAGNOSIS — R42 VERTIGO: ICD-10-CM

## 2019-10-30 DIAGNOSIS — R41.3 MEMORY LOSS: Primary | ICD-10-CM

## 2019-10-30 PROCEDURE — G8417 CALC BMI ABV UP PARAM F/U: HCPCS | Performed by: FAMILY MEDICINE

## 2019-10-30 PROCEDURE — G8484 FLU IMMUNIZE NO ADMIN: HCPCS | Performed by: FAMILY MEDICINE

## 2019-10-30 PROCEDURE — G8427 DOCREV CUR MEDS BY ELIG CLIN: HCPCS | Performed by: FAMILY MEDICINE

## 2019-10-30 PROCEDURE — 1036F TOBACCO NON-USER: CPT | Performed by: FAMILY MEDICINE

## 2019-10-30 PROCEDURE — 99213 OFFICE O/P EST LOW 20 MIN: CPT | Performed by: FAMILY MEDICINE

## 2019-10-30 ASSESSMENT — ENCOUNTER SYMPTOMS
GASTROINTESTINAL NEGATIVE: 1
SHORTNESS OF BREATH: 0
EYES NEGATIVE: 1
RESPIRATORY NEGATIVE: 1
ALLERGIC/IMMUNOLOGIC NEGATIVE: 1

## 2019-11-25 ENCOUNTER — OFFICE VISIT (OUTPATIENT)
Dept: NEUROLOGY | Age: 44
End: 2019-11-25
Payer: COMMERCIAL

## 2019-11-25 VITALS
WEIGHT: 231 LBS | BODY MASS INDEX: 35.01 KG/M2 | HEIGHT: 68 IN | HEART RATE: 90 BPM | SYSTOLIC BLOOD PRESSURE: 116 MMHG | DIASTOLIC BLOOD PRESSURE: 80 MMHG

## 2019-11-25 DIAGNOSIS — R41.3 MEMORY LOSS: Primary | ICD-10-CM

## 2019-11-25 DIAGNOSIS — R42 DIZZINESS AND GIDDINESS: ICD-10-CM

## 2019-11-25 PROCEDURE — 99205 OFFICE O/P NEW HI 60 MIN: CPT | Performed by: PSYCHIATRY & NEUROLOGY

## 2019-11-25 RX ORDER — MODAFINIL 100 MG/1
100 TABLET ORAL DAILY
Qty: 30 TABLET | Refills: 1 | Status: SHIPPED | OUTPATIENT
Start: 2019-11-25 | End: 2020-02-05 | Stop reason: SDUPTHER

## 2019-11-30 PROBLEM — R42 DIZZINESS AND GIDDINESS: Status: ACTIVE | Noted: 2019-11-30

## 2019-11-30 PROBLEM — R41.3 MEMORY LOSS: Status: ACTIVE | Noted: 2019-11-30

## 2019-11-30 ASSESSMENT — ENCOUNTER SYMPTOMS
BACK PAIN: 0
SHORTNESS OF BREATH: 0
PHOTOPHOBIA: 0
CHOKING: 0
VOMITING: 0
TROUBLE SWALLOWING: 0
NAUSEA: 0

## 2020-01-08 ENCOUNTER — OFFICE VISIT (OUTPATIENT)
Dept: FAMILY MEDICINE CLINIC | Age: 45
End: 2020-01-08
Payer: COMMERCIAL

## 2020-01-08 VITALS
BODY MASS INDEX: 35.46 KG/M2 | HEIGHT: 68 IN | RESPIRATION RATE: 14 BRPM | TEMPERATURE: 98.1 F | DIASTOLIC BLOOD PRESSURE: 80 MMHG | OXYGEN SATURATION: 95 % | HEART RATE: 104 BPM | SYSTOLIC BLOOD PRESSURE: 126 MMHG | WEIGHT: 234 LBS

## 2020-01-08 PROCEDURE — G8427 DOCREV CUR MEDS BY ELIG CLIN: HCPCS | Performed by: NURSE PRACTITIONER

## 2020-01-08 PROCEDURE — 99213 OFFICE O/P EST LOW 20 MIN: CPT | Performed by: NURSE PRACTITIONER

## 2020-01-08 PROCEDURE — G8484 FLU IMMUNIZE NO ADMIN: HCPCS | Performed by: NURSE PRACTITIONER

## 2020-01-08 PROCEDURE — 1036F TOBACCO NON-USER: CPT | Performed by: NURSE PRACTITIONER

## 2020-01-08 PROCEDURE — G8417 CALC BMI ABV UP PARAM F/U: HCPCS | Performed by: NURSE PRACTITIONER

## 2020-01-08 RX ORDER — DEXTROMETHORPHAN HYDROBROMIDE AND PROMETHAZINE HYDROCHLORIDE 15; 6.25 MG/5ML; MG/5ML
5 SYRUP ORAL 4 TIMES DAILY PRN
Qty: 1 BOTTLE | Refills: 0 | Status: SHIPPED | OUTPATIENT
Start: 2020-01-08 | End: 2021-07-26

## 2020-01-08 RX ORDER — BENZONATATE 100 MG/1
100-200 CAPSULE ORAL 3 TIMES DAILY PRN
Qty: 60 CAPSULE | Refills: 0 | Status: SHIPPED | OUTPATIENT
Start: 2020-01-08 | End: 2020-01-18

## 2020-01-08 RX ORDER — CETIRIZINE HYDROCHLORIDE, PSEUDOEPHEDRINE HYDROCHLORIDE 5; 120 MG/1; MG/1
1 TABLET, FILM COATED, EXTENDED RELEASE ORAL 2 TIMES DAILY
Qty: 30 TABLET | Refills: 0 | Status: SHIPPED | OUTPATIENT
Start: 2020-01-08 | End: 2020-01-23

## 2020-01-08 RX ORDER — DOXYCYCLINE HYCLATE 100 MG
100 TABLET ORAL 2 TIMES DAILY
Qty: 20 TABLET | Refills: 0 | Status: SHIPPED | OUTPATIENT
Start: 2020-01-08 | End: 2020-01-18

## 2020-01-08 RX ORDER — PREDNISONE 20 MG/1
40 TABLET ORAL DAILY
Qty: 8 TABLET | Refills: 0 | Status: SHIPPED | OUTPATIENT
Start: 2020-01-08 | End: 2020-01-12

## 2020-01-08 ASSESSMENT — PATIENT HEALTH QUESTIONNAIRE - PHQ9
SUM OF ALL RESPONSES TO PHQ QUESTIONS 1-9: 0
2. FEELING DOWN, DEPRESSED OR HOPELESS: 0
SUM OF ALL RESPONSES TO PHQ QUESTIONS 1-9: 0
1. LITTLE INTEREST OR PLEASURE IN DOING THINGS: 0
SUM OF ALL RESPONSES TO PHQ9 QUESTIONS 1 & 2: 0

## 2020-01-08 ASSESSMENT — ENCOUNTER SYMPTOMS
EYE DISCHARGE: 0
CONSTIPATION: 0
SINUS PAIN: 0
ABDOMINAL DISTENTION: 0
NAUSEA: 0
RHINORRHEA: 0
SORE THROAT: 0
ABDOMINAL PAIN: 0
BACK PAIN: 0
SINUS PRESSURE: 0
FACIAL SWELLING: 0
WHEEZING: 0
VOMITING: 0
EYES NEGATIVE: 1
SHORTNESS OF BREATH: 0
DIARRHEA: 0
COUGH: 1

## 2020-01-08 NOTE — PROGRESS NOTES
Subjective:      Patient ID: Sunshine Lynn is a 38 y.o. male who presents today for:  Chief Complaint   Patient presents with    Cough     Patient states its been going for about 2 weeks    Facial Pain       HPI    Sinuses started bothering me about 10 days ago and they continue to drain now causing me to have a dry short cough. This cough wont stop-not taking anything for it at home    Last time this happened I waited a long time and got very sick            Past Medical History:   Diagnosis Date    Abdominal pain     Allergic rhinitis     Enterocolitis     Obstructive sleep apnea     PTSD (post-traumatic stress disorder)     Served in [employer] 5979-6749     No past surgical history on file.   Social History     Socioeconomic History    Marital status:      Spouse name: Not on file    Number of children: Not on file    Years of education: Not on file    Highest education level: Not on file   Occupational History    Not on file   Social Needs    Financial resource strain: Not on file    Food insecurity:     Worry: Not on file     Inability: Not on file    Transportation needs:     Medical: Not on file     Non-medical: Not on file   Tobacco Use    Smoking status: Never Smoker    Smokeless tobacco: Never Used   Substance and Sexual Activity    Alcohol use: Yes     Comment: occasional    Drug use: No    Sexual activity: Not on file   Lifestyle    Physical activity:     Days per week: Not on file     Minutes per session: Not on file    Stress: Not on file   Relationships    Social connections:     Talks on phone: Not on file     Gets together: Not on file     Attends Druze service: Not on file     Active member of club or organization: Not on file     Attends meetings of clubs or organizations: Not on file     Relationship status: Not on file    Intimate partner violence:     Fear of current or ex partner: Not on file     Emotionally abused: Not on file     Physically abused: Not on file

## 2020-01-08 NOTE — PATIENT INSTRUCTIONS
notice more mucus or a change in the color of your mucus.     · You have new symptoms, such as a sore throat, an earache, or sinus pain.     · You do not get better as expected. Where can you learn more? Go to [email or web address] and sign in to your [employer] account. Enter D279 in the Flow[employer] box to learn more about \"Cough: Care Instructions. \"     If you do not have an account, please click on the \"Sign Up Now\" link. Current as of: June 9, 2019  Content Version: 12.3  © 9714-0293 [employer]. Care instructions adapted under license by [facility] ([facility]). If you have questions about a medical condition or this instruction, always ask your healthcare professional. [address] any warranty or liability for your use of this information. Patient Education        Bronchitis: Care Instructions  Your Care Instructions    Bronchitis is inflammation of the bronchial tubes, which carry air to the lungs. The tubes swell and produce mucus, or phlegm. The mucus and inflamed bronchial tubes make you cough. You may have trouble breathing. Most cases of bronchitis are caused by viruses like those that cause colds. Antibiotics usually do not help and they may be harmful. Bronchitis usually develops rapidly and lasts about 2 to 3 weeks in otherwise healthy people. Follow-up care is a key part of your treatment and safety. Be sure to make and go to all appointments, and call your doctor if you are having problems. It's also a good idea to know your test results and keep a list of the medicines you take. How can you care for yourself at home? · Take all medicines exactly as prescribed. Call your doctor if you think you are having a problem with your medicine. · Get some extra rest.  · Take an over-the-counter pain medicine, such as acetaminophen (Tylenol), ibuprofen (Advil, Motrin), or naproxen (Aleve) to reduce fever and relieve body aches.  Read and

## 2020-02-05 ENCOUNTER — TELEPHONE (OUTPATIENT)
Dept: NEUROLOGY | Age: 45
End: 2020-02-05

## 2020-02-05 RX ORDER — MODAFINIL 100 MG/1
100 TABLET ORAL DAILY
Qty: 30 TABLET | Refills: 1 | Status: CANCELLED | OUTPATIENT
Start: 2020-02-05 | End: 2020-03-06

## 2020-02-05 RX ORDER — MODAFINIL 100 MG/1
100 TABLET ORAL DAILY
Qty: 30 TABLET | Refills: 1 | Status: SHIPPED | OUTPATIENT
Start: 2020-02-05 | End: 2020-03-11 | Stop reason: SDUPTHER

## 2020-02-05 NOTE — TELEPHONE ENCOUNTER
Called patient to verify his pharmacy due to getting approval on Modafinil. Left message stating that we needed to verify his pharmacy and to make sure that he was still willing to try the medication. To please call our office back at [phone].

## 2020-02-05 NOTE — TELEPHONE ENCOUNTER
Could you please send this medication. This is the one that we spoke about for the patient that I got approved. Thank You.

## 2020-02-12 ENCOUNTER — OFFICE VISIT (OUTPATIENT)
Dept: NEUROLOGY | Age: 45
End: 2020-02-12
Payer: COMMERCIAL

## 2020-02-12 VITALS
HEART RATE: 98 BPM | WEIGHT: 232 LBS | BODY MASS INDEX: 35.28 KG/M2 | SYSTOLIC BLOOD PRESSURE: 141 MMHG | DIASTOLIC BLOOD PRESSURE: 94 MMHG

## 2020-02-12 PROCEDURE — G8484 FLU IMMUNIZE NO ADMIN: HCPCS | Performed by: PSYCHIATRY & NEUROLOGY

## 2020-02-12 PROCEDURE — G8427 DOCREV CUR MEDS BY ELIG CLIN: HCPCS | Performed by: PSYCHIATRY & NEUROLOGY

## 2020-02-12 PROCEDURE — 1036F TOBACCO NON-USER: CPT | Performed by: PSYCHIATRY & NEUROLOGY

## 2020-02-12 PROCEDURE — G8417 CALC BMI ABV UP PARAM F/U: HCPCS | Performed by: PSYCHIATRY & NEUROLOGY

## 2020-02-12 PROCEDURE — 99214 OFFICE O/P EST MOD 30 MIN: CPT | Performed by: PSYCHIATRY & NEUROLOGY

## 2020-02-12 NOTE — PROGRESS NOTES
Years of education: Not on file    Highest education level: Not on file   Occupational History    Not on file   Social Needs    Financial resource strain: Not on file    Food insecurity:     Worry: Not on file     Inability: Not on file    Transportation needs:     Medical: Not on file     Non-medical: Not on file   Tobacco Use    Smoking status: Never Smoker    Smokeless tobacco: Never Used   Substance and Sexual Activity    Alcohol use: Yes     Comment: occasional    Drug use: No    Sexual activity: Not on file   Lifestyle    Physical activity:     Days per week: Not on file     Minutes per session: Not on file    Stress: Not on file   Relationships    Social connections:     Talks on phone: Not on file     Gets together: Not on file     Attends Adventism service: Not on file     Active member of club or organization: Not on file     Attends meetings of clubs or organizations: Not on file     Relationship status: Not on file    Intimate partner violence:     Fear of current or ex partner: Not on file     Emotionally abused: Not on file     Physically abused: Not on file     Forced sexual activity: Not on file   Other Topics Concern    Not on file   Social History Narrative    Not on file     Family History   Problem Relation Age of Onset    Thyroid Cancer Mother     Diabetes Other     Alzheimer's Disease Other      No Known Allergies      Review of Systems    Objective:   BP (!) 141/94 (Site: Right Upper Arm, Position: Sitting, Cuff Size: Medium Adult)   Pulse 98   Wt 232 lb (105.2 kg)   BMI 35.28 kg/m²     Physical Exam    Xr Abdomen (kub) (single Ap View)    Result Date: 7/22/2019  EXAMINATION: KUB CLINICAL HISTORY: Nephrolithiasis, follow-up COMPARISON : CT scan abdomen pelvis July 3, 2019 FINDINGS: 2 views of the abdomen  submitted. 2 views of the abdomen shows bowel gas in both large and small bowel. Bowel gas seen  to the  level of the rectum.   The bowel gas pattern is nonspecific nonobstructive. Faint less than 2 mm calculi in the right hemipelvis. NONSPECIFIC BOWEL GAS PATTERN. FAINT LESS THAN 2 MM CALCULI IN THE RIGHT HEMIPELVIS      Lab Results   Component Value Date    WBC 6.2 07/03/2019    RBC 4.82 07/03/2019    HGB 14.5 07/03/2019    HCT 40.8 07/03/2019    MCV 84.6 07/03/2019    MCH 30.0 07/03/2019    MCHC 35.4 07/03/2019    RDW 13.7 07/03/2019     07/03/2019    MPV 7.2 05/17/2013     Lab Results   Component Value Date     07/03/2019    K 4.0 07/03/2019     07/03/2019    CO2 26 07/03/2019    BUN 16 07/03/2019    CREATININE 1.37 07/03/2019    GFRAA >60.0 07/03/2019    LABGLOM 56.3 07/03/2019    GLUCOSE 142 07/03/2019    PROT 7.4 07/03/2019    LABALBU 4.6 07/03/2019    CALCIUM 9.5 07/03/2019    BILITOT 0.7 07/03/2019    ALKPHOS 51 07/03/2019    AST 25 07/03/2019    ALT 26 07/03/2019     No results found for: PROTIME, INR  No results found for: TSH, QVVSRSMX50, FOLATE, FERRITIN, IRON, TIBC, PTRFSAT, TSH, FREET4  No results found for: TRIG, HDL, LDLCALC, LDLDIRECT, LABVLDL  No results found for: LABAMPH, BARBSCNU, LABBENZ, CANNAB, COCAINESCRN, LABMETH, OPIATESCREENURINE, PHENCYCLIDINESCREENURINE, PPXUR, ETOH  No results found for: LITHIUM, DILFRTOT, VALPROATE    Assessment:      Memory loss which may represent REM sleep disorder . He started Provigil 2 days ago and feels much better. He had an eval with Dr Maryann Anderson and I did not receive any information. We will see how he does foe now  All his other investigations have been normal    Fede Vallejo MD, Rukhsana Yates, American Board of Psychiatry & Neurology  Board Certified in Vascular Neurology  Board Certified in Neuromuscular Medicine  Certified in [facility] [address]:      No orders of the defined types were placed in this encounter. No orders of the defined types were placed in this encounter. No follow-ups on file.        Elia Son MD

## 2020-02-28 ENCOUNTER — HOSPITAL ENCOUNTER (OUTPATIENT)
Dept: MRI IMAGING | Age: 45
Discharge: HOME | End: 2020-03-01
Payer: COMMERCIAL

## 2020-02-28 ENCOUNTER — HOSPITAL ENCOUNTER (OUTPATIENT)
Dept: NEUROLOGY | Age: 45
Discharge: HOME | End: 2020-02-28
Payer: COMMERCIAL

## 2020-02-28 PROCEDURE — 6360000004 HC RX CONTRAST MEDICATION: Performed by: PSYCHIATRY & NEUROLOGY

## 2020-02-28 PROCEDURE — 70553 MRI BRAIN STEM W/O & W/DYE: CPT

## 2020-02-28 PROCEDURE — 95816 EEG AWAKE AND DROWSY: CPT | Performed by: PSYCHIATRY & NEUROLOGY

## 2020-02-28 PROCEDURE — 95816 EEG AWAKE AND DROWSY: CPT

## 2020-02-28 PROCEDURE — A9579 GAD-BASE MR CONTRAST NOS,1ML: HCPCS | Performed by: PSYCHIATRY & NEUROLOGY

## 2020-02-28 RX ADMIN — GADOTERIDOL 20 ML: 279.3 INJECTION, SOLUTION INTRAVENOUS at 09:52

## 2020-03-01 NOTE — PROCEDURES
[address]                      [address], [address] [facility]                          ELECTROENCEPHALOGRAM REPORT    PATIENT NAME: Cristiana Burt                       :        1975  MED REC NO:   [id number]                            ROOM:  ACCOUNT NO:   [de-identified]                           ADMIT DATE: 2020  PROVIDER:     Dangelo Miguel MD    DATE OF EE2020    The patient was seen for memory loss. EEG FINDINGS:  The background rhythm of this EEG shows a 8-97 Hz  posterior dominant rhythm of alpha. This is symmetrical and attenuates  with eye opening. EKG is monitored, this is regular. Photic  stimulation is performed without any photic responses. There is no  photo response to seizures. The patient does not have session of  asymmetries. Hyperventilation is performed with good effort without any  change in frequencies. EKG artifacts are seen throughout the EEG  recording. The record remains in awake phase with some minor drowsy  patterns. IMPRESSION:  This is a normal awake and drowsy EEG recording. Clinical  correlation is recommended.         Bonifacio Dyer MD    D: 2020 12:24:17       T: 2020 13:06:45     [email or web address]  Job#: [id number]     Doc#: [id number]    CC:

## 2020-03-11 ENCOUNTER — OFFICE VISIT (OUTPATIENT)
Dept: NEUROLOGY | Age: 45
End: 2020-03-11
Payer: COMMERCIAL

## 2020-03-11 VITALS
WEIGHT: 230.7 LBS | SYSTOLIC BLOOD PRESSURE: 131 MMHG | DIASTOLIC BLOOD PRESSURE: 93 MMHG | BODY MASS INDEX: 35.08 KG/M2 | HEART RATE: 80 BPM

## 2020-03-11 PROCEDURE — G8484 FLU IMMUNIZE NO ADMIN: HCPCS | Performed by: PSYCHIATRY & NEUROLOGY

## 2020-03-11 PROCEDURE — G8427 DOCREV CUR MEDS BY ELIG CLIN: HCPCS | Performed by: PSYCHIATRY & NEUROLOGY

## 2020-03-11 PROCEDURE — 1036F TOBACCO NON-USER: CPT | Performed by: PSYCHIATRY & NEUROLOGY

## 2020-03-11 PROCEDURE — G8417 CALC BMI ABV UP PARAM F/U: HCPCS | Performed by: PSYCHIATRY & NEUROLOGY

## 2020-03-11 PROCEDURE — 99214 OFFICE O/P EST MOD 30 MIN: CPT | Performed by: PSYCHIATRY & NEUROLOGY

## 2020-03-11 RX ORDER — MODAFINIL 100 MG/1
100 TABLET ORAL DAILY
Qty: 30 TABLET | Refills: 1 | Status: SHIPPED | OUTPATIENT
Start: 2020-03-11 | End: 2020-04-10

## 2020-03-11 RX ORDER — ESZOPICLONE 2 MG/1
2 TABLET, FILM COATED ORAL NIGHTLY
Qty: 30 TABLET | Refills: 0 | Status: SHIPPED | OUTPATIENT
Start: 2020-03-11 | End: 2021-05-12 | Stop reason: SDUPTHER

## 2020-03-11 RX ORDER — MODAFINIL 100 MG/1
100 TABLET ORAL DAILY
COMMUNITY
End: 2020-06-15 | Stop reason: SDUPTHER

## 2020-03-11 RX ORDER — MODAFINIL 100 MG/1
100 TABLET ORAL DAILY
Qty: 30 TABLET | Refills: 1 | Status: SHIPPED | OUTPATIENT
Start: 2020-03-11 | End: 2020-03-11 | Stop reason: SDUPTHER

## 2020-03-11 NOTE — PROGRESS NOTES
Neck:      Musculoskeletal: Normal range of motion. Cardiovascular:      Rate and Rhythm: Normal rate and regular rhythm. Heart sounds: No murmur. Pulmonary:      Effort: Pulmonary effort is normal.      Breath sounds: Normal breath sounds. Musculoskeletal: Normal range of motion. Neurological:      Mental Status: He is alert and oriented to person, place, and time. Cranial Nerves: No cranial nerve deficit. Sensory: No sensory deficit. Motor: No abnormal muscle tone. Coordination: Coordination normal.      Deep Tendon Reflexes: Reflexes are normal and symmetric. Babinski sign absent on the right side. Babinski sign absent on the left side. Mri Brain W Wo Contrast    Result Date: 2020  Patient MRN: [id number] : 1975 Age:  38 years Gender: Male Order Date: 2020 8:38 AM. Exam: MRI BRAIN W WO CONTRAST Number of Views: 438 Indication:  Memory loss, vertigo, blurry vision. Tumors. Comparison: MRI of the brain 1/10/2012. Findings: No evidence of restricted diffusion or acute/subacute cerebrovascular infarction/accident. Pituitary gland and sellar/suprasellar regions are unremarkable. No Chiari malformation. . Hippocampal formations are symmetric in signal intensity and morphology bilaterally. No intrinsic noncontrast T1 shortening. Mild cerebral volume loss/atrophy. No subfalcine, transtentorial or tonsillar herniation or shift. Normal expected appearance the visualized T2 flow voids. Optic globes and orbital contents are within normal limits. Visualized paranasal sinuses and mastoid air cells are free from fluid or mass. . No extra-axial fluid collection or hematoma. No intraparenchymal hemorrhage. Postcontrast imaging demonstrates no evidence of enhancing mass, malignancy or arteriovenous malformation. No dural venous sinus thrombosis or occlusion. Impression:  1.  No evidence of restricted diffusion, acute/subacute cerebrovascular infarction/accident,

## 2020-03-12 PROBLEM — G47.01 INSOMNIA DUE TO MEDICAL CONDITION: Status: ACTIVE | Noted: 2020-03-12

## 2020-03-12 PROBLEM — G47.419 NARCOLEPSY WITHOUT CATAPLEXY: Status: ACTIVE | Noted: 2020-03-12

## 2020-03-12 ASSESSMENT — ENCOUNTER SYMPTOMS
VOMITING: 0
PHOTOPHOBIA: 0
NAUSEA: 0
SHORTNESS OF BREATH: 0
TROUBLE SWALLOWING: 0
CHOKING: 0
BACK PAIN: 0

## 2020-05-04 ENCOUNTER — OFFICE VISIT (OUTPATIENT)
Dept: NEUROLOGY | Age: 45
End: 2020-05-04
Payer: COMMERCIAL

## 2020-05-04 VITALS — DIASTOLIC BLOOD PRESSURE: 89 MMHG | SYSTOLIC BLOOD PRESSURE: 140 MMHG | HEART RATE: 74 BPM

## 2020-05-04 PROBLEM — F51.4 NIGHT TERRORS: Status: ACTIVE | Noted: 2020-05-04

## 2020-05-04 PROCEDURE — G8427 DOCREV CUR MEDS BY ELIG CLIN: HCPCS | Performed by: PSYCHIATRY & NEUROLOGY

## 2020-05-04 PROCEDURE — 1036F TOBACCO NON-USER: CPT | Performed by: PSYCHIATRY & NEUROLOGY

## 2020-05-04 PROCEDURE — 99214 OFFICE O/P EST MOD 30 MIN: CPT | Performed by: PSYCHIATRY & NEUROLOGY

## 2020-05-04 PROCEDURE — G8417 CALC BMI ABV UP PARAM F/U: HCPCS | Performed by: PSYCHIATRY & NEUROLOGY

## 2020-05-04 RX ORDER — SOLRIAMFETOL 75 MG/1
75 TABLET, FILM COATED ORAL NIGHTLY
Qty: 30 TABLET | Refills: 1 | Status: SHIPPED | OUTPATIENT
Start: 2020-05-04 | End: 2020-06-03

## 2020-05-04 ASSESSMENT — ENCOUNTER SYMPTOMS
NAUSEA: 0
VOMITING: 0
CHOKING: 0
SHORTNESS OF BREATH: 0
PHOTOPHOBIA: 0
TROUBLE SWALLOWING: 0
COLOR CHANGE: 0
BACK PAIN: 0

## 2020-05-04 NOTE — PROGRESS NOTES
 Smoking status: Never Smoker    Smokeless tobacco: Never Used   Substance and Sexual Activity    Alcohol use: Yes     Comment: occasional    Drug use: No    Sexual activity: Not on file   Lifestyle    Physical activity     Days per week: Not on file     Minutes per session: Not on file    Stress: Not on file   Relationships    Social connections     Talks on phone: Not on file     Gets together: Not on file     Attends Taoist service: Not on file     Active member of club or organization: Not on file     Attends meetings of clubs or organizations: Not on file     Relationship status: Not on file    Intimate partner violence     Fear of current or ex partner: Not on file     Emotionally abused: Not on file     Physically abused: Not on file     Forced sexual activity: Not on file   Other Topics Concern    Not on file   Social History Narrative    Not on file     Family History   Problem Relation Age of Onset    Thyroid Cancer Mother     Diabetes Other     Alzheimer's Disease Other      No Known Allergies    Current Outpatient Medications   Medication Sig Dispense Refill    Solriamfetol HCl (SUNOSI) 75 MG TABS Take 75 mg by mouth nightly 30 tablet 1    modafinil (PROVIGIL) 100 MG tablet Take 100 mg by mouth daily.  promethazine-dextromethorphan (PROMETHAZINE-DM) 6.25-15 MG/5ML syrup Take 5 mLs by mouth 4 times daily as needed for Cough 1 Bottle 0    traZODone (DESYREL) 50 MG tablet Take 1 tablet by mouth nightly 90 tablet 1    diphenhydrAMINE HCl (BENADRYL ALLERGY PO) Take by mouth daily Indications: for Poison Ivy      fluticasone (FLONASE) 50 MCG/ACT nasal spray 1 spray by Nasal route daily 1 Bottle 0    ondansetron (ZOFRAN) 4 MG tablet Take 4 mg by mouth every 8 hours as needed for Nausea or Vomiting      cetirizine (ZYRTEC) 10 MG tablet Take 1 tablet by mouth daily 30 tablet 3    therapeutic multivitamin-minerals (THERAGRAN-M) tablet Take 1 tablet by mouth daily.       eszopiclone (LUNESTA) 2 MG TABS Take 1 tablet by mouth nightly. (Patient not taking: Reported on 5/4/2020) 30 tablet 0     No current facility-administered medications for this visit. Review of Systems   Constitutional: Negative for fever. HENT: Negative for ear pain, tinnitus and trouble swallowing. Eyes: Negative for photophobia and visual disturbance. Respiratory: Negative for choking and shortness of breath. Cardiovascular: Negative for chest pain and palpitations. Gastrointestinal: Negative for nausea and vomiting. Musculoskeletal: Negative for back pain, gait problem, joint swelling, myalgias, neck pain and neck stiffness. Skin: Negative for color change. Allergic/Immunologic: Negative for food allergies. Neurological: Negative for dizziness, tremors, seizures, syncope, facial asymmetry, speech difficulty, weakness, light-headedness, numbness and headaches. Psychiatric/Behavioral: Negative for behavioral problems, confusion, hallucinations and sleep disturbance. Objective:   BP (!) 140/89 (Site: Left Upper Arm, Position: Sitting, Cuff Size: Large Adult)   Pulse 74     Physical Exam  Vitals signs reviewed. Eyes:      Pupils: Pupils are equal, round, and reactive to light. Neck:      Musculoskeletal: Normal range of motion. Cardiovascular:      Rate and Rhythm: Normal rate and regular rhythm. Heart sounds: No murmur. Pulmonary:      Effort: Pulmonary effort is normal.      Breath sounds: Normal breath sounds. Abdominal:      General: Bowel sounds are normal.   Musculoskeletal: Normal range of motion. Skin:     General: Skin is warm. Neurological:      Mental Status: He is alert and oriented to person, place, and time. Cranial Nerves: No cranial nerve deficit. Sensory: No sensory deficit. Motor: No abnormal muscle tone. Coordination: Coordination normal.      Deep Tendon Reflexes: Reflexes are normal and symmetric.  Babinski sign absent on the right

## 2020-06-15 ENCOUNTER — OFFICE VISIT (OUTPATIENT)
Dept: NEUROLOGY | Age: 45
End: 2020-06-15
Payer: COMMERCIAL

## 2020-06-15 VITALS
WEIGHT: 234 LBS | HEART RATE: 79 BPM | SYSTOLIC BLOOD PRESSURE: 144 MMHG | BODY MASS INDEX: 35.58 KG/M2 | DIASTOLIC BLOOD PRESSURE: 91 MMHG

## 2020-06-15 PROBLEM — M54.2 CERVICALGIA: Status: ACTIVE | Noted: 2020-06-15

## 2020-06-15 PROCEDURE — 1036F TOBACCO NON-USER: CPT | Performed by: PSYCHIATRY & NEUROLOGY

## 2020-06-15 PROCEDURE — G8427 DOCREV CUR MEDS BY ELIG CLIN: HCPCS | Performed by: PSYCHIATRY & NEUROLOGY

## 2020-06-15 PROCEDURE — 99214 OFFICE O/P EST MOD 30 MIN: CPT | Performed by: PSYCHIATRY & NEUROLOGY

## 2020-06-15 PROCEDURE — G8417 CALC BMI ABV UP PARAM F/U: HCPCS | Performed by: PSYCHIATRY & NEUROLOGY

## 2020-06-15 RX ORDER — MODAFINIL 100 MG/1
100 TABLET ORAL DAILY
Qty: 30 TABLET | Refills: 0 | Status: SHIPPED | OUTPATIENT
Start: 2020-06-15 | End: 2020-09-22 | Stop reason: SDUPTHER

## 2020-06-15 RX ORDER — TIZANIDINE 4 MG/1
TABLET ORAL
Qty: 45 TABLET | Refills: 3 | Status: SHIPPED | OUTPATIENT
Start: 2020-06-15 | End: 2020-09-02 | Stop reason: SDUPTHER

## 2020-06-15 ASSESSMENT — ENCOUNTER SYMPTOMS
VOMITING: 0
COLOR CHANGE: 0
SHORTNESS OF BREATH: 0
NAUSEA: 0
PHOTOPHOBIA: 0
CHOKING: 0
BACK PAIN: 0
TROUBLE SWALLOWING: 0

## 2020-06-15 NOTE — PROGRESS NOTES
Never Smoker    Smokeless tobacco: Never Used   Substance and Sexual Activity    Alcohol use: Yes     Comment: occasional    Drug use: No    Sexual activity: Not on file   Lifestyle    Physical activity     Days per week: Not on file     Minutes per session: Not on file    Stress: Not on file   Relationships    Social connections     Talks on phone: Not on file     Gets together: Not on file     Attends Judaism service: Not on file     Active member of club or organization: Not on file     Attends meetings of clubs or organizations: Not on file     Relationship status: Not on file    Intimate partner violence     Fear of current or ex partner: Not on file     Emotionally abused: Not on file     Physically abused: Not on file     Forced sexual activity: Not on file   Other Topics Concern    Not on file   Social History Narrative    Not on file     Family History   Problem Relation Age of Onset    Thyroid Cancer Mother     Diabetes Other     Alzheimer's Disease Other      No Known Allergies    Current Outpatient Medications   Medication Sig Dispense Refill    modafinil (PROVIGIL) 100 MG tablet Take 1 tablet by mouth daily for 30 days. 30 tablet 0    tiZANidine (ZANAFLEX) 4 MG tablet One qhs for 1 week, then one and half qhs 45 tablet 3    promethazine-dextromethorphan (PROMETHAZINE-DM) 6.25-15 MG/5ML syrup Take 5 mLs by mouth 4 times daily as needed for Cough 1 Bottle 0    traZODone (DESYREL) 50 MG tablet Take 1 tablet by mouth nightly 90 tablet 1    diphenhydrAMINE HCl (BENADRYL ALLERGY PO) Take by mouth daily Indications: for Poison Ivy      fluticasone (FLONASE) 50 MCG/ACT nasal spray 1 spray by Nasal route daily 1 Bottle 0    ondansetron (ZOFRAN) 4 MG tablet Take 4 mg by mouth every 8 hours as needed for Nausea or Vomiting      cetirizine (ZYRTEC) 10 MG tablet Take 1 tablet by mouth daily 30 tablet 3    therapeutic multivitamin-minerals (THERAGRAN-M) tablet Take 1 tablet by mouth daily. Reflexes are normal and symmetric. Babinski sign absent on the right side. Babinski sign absent on the left side. Psychiatric:         Mood and Affect: Mood normal.         Mri Brain W Wo Contrast    Result Date: 2020  Patient MRN: [id number] : 1975 Age:  38 years Gender: Male Order Date: 2020 8:38 AM. Exam: MRI BRAIN W WO CONTRAST Number of Views: 885 Indication:  Memory loss, vertigo, blurry vision. Tumors. Comparison: MRI of the brain 1/10/2012. Findings: No evidence of restricted diffusion or acute/subacute cerebrovascular infarction/accident. Pituitary gland and sellar/suprasellar regions are unremarkable. No Chiari malformation. . Hippocampal formations are symmetric in signal intensity and morphology bilaterally. No intrinsic noncontrast T1 shortening. Mild cerebral volume loss/atrophy. No subfalcine, transtentorial or tonsillar herniation or shift. Normal expected appearance the visualized T2 flow voids. Optic globes and orbital contents are within normal limits. Visualized paranasal sinuses and mastoid air cells are free from fluid or mass. . No extra-axial fluid collection or hematoma. No intraparenchymal hemorrhage. Postcontrast imaging demonstrates no evidence of enhancing mass, malignancy or arteriovenous malformation. No dural venous sinus thrombosis or occlusion. Impression:  1. No evidence of restricted diffusion, acute/subacute cerebrovascular infarction/accident, enhancing mass/malignancy arteriovenous malformation. 2. Mild cerebral volume loss/atrophy.  3. Otherwise, unremarkable pre and postcontrast examination of the brain       Lab Results   Component Value Date    WBC 6.2 2019    RBC 4.82 2019    HGB 14.5 2019    HCT 40.8 2019    MCV 84.6 2019    MCH 30.0 2019    MCHC 35.4 2019    RDW 13.7 2019     2019    MPV 7.2 2013     Lab Results   Component Value Date     2019    K 4.0 2019     yes

## 2020-07-02 ENCOUNTER — OFFICE VISIT (OUTPATIENT)
Dept: FAMILY MEDICINE CLINIC | Age: 45
End: 2020-07-02
Payer: COMMERCIAL

## 2020-07-02 VITALS
TEMPERATURE: 97.4 F | SYSTOLIC BLOOD PRESSURE: 122 MMHG | BODY MASS INDEX: 35.58 KG/M2 | WEIGHT: 234 LBS | HEART RATE: 78 BPM | DIASTOLIC BLOOD PRESSURE: 82 MMHG | OXYGEN SATURATION: 98 %

## 2020-07-02 PROCEDURE — 69209 REMOVE IMPACTED EAR WAX UNI: CPT | Performed by: NURSE PRACTITIONER

## 2020-07-02 PROCEDURE — 99213 OFFICE O/P EST LOW 20 MIN: CPT | Performed by: NURSE PRACTITIONER

## 2020-07-02 PROCEDURE — 1036F TOBACCO NON-USER: CPT | Performed by: NURSE PRACTITIONER

## 2020-07-02 PROCEDURE — G8417 CALC BMI ABV UP PARAM F/U: HCPCS | Performed by: NURSE PRACTITIONER

## 2020-07-02 PROCEDURE — G8427 DOCREV CUR MEDS BY ELIG CLIN: HCPCS | Performed by: NURSE PRACTITIONER

## 2020-07-02 NOTE — PROGRESS NOTES
cerumen. No mastoid tenderness. Nose: Nose normal.   Eyes:      Conjunctiva/sclera: Conjunctivae normal.   Neck:      Musculoskeletal: Normal range of motion. No neck rigidity. Cardiovascular:      Rate and Rhythm: Normal rate. Pulmonary:      Effort: Pulmonary effort is normal.   Skin:     General: Skin is warm and dry. Neurological:      General: No focal deficit present. Mental Status: He is alert and oriented to person, place, and time. Psychiatric:         Mood and Affect: Mood normal.         Behavior: Behavior normal.           Assessment & Plan    Diagnosis Orders   1. Bilateral impacted cerumen  LA REMOVAL IMPACTED CERUMEN IRRIGATION/LVG UNILAT     Orders Placed This Encounter   Procedures    LA REMOVAL IMPACTED CERUMEN IRRIGATION/LVG UNILAT     No orders of the defined types were placed in this encounter. Return if symptoms worsen or fail to improve. Reviewed with the patient: current clinical status & medications. The treatment plan/rationale and result expectations have been discussed with the patient who expresses understanding. Close follow up to evaluate treatment results and for coordination of care. I have reviewed the patient's medical history in detail and updated the computerized patient record.       MANAV Winston NP

## 2020-09-02 RX ORDER — TIZANIDINE 4 MG/1
TABLET ORAL
Qty: 45 TABLET | Refills: 3 | Status: SHIPPED | OUTPATIENT
Start: 2020-09-02 | End: 2020-09-22 | Stop reason: SDUPTHER

## 2020-09-22 RX ORDER — MODAFINIL 100 MG/1
100 TABLET ORAL DAILY
Qty: 30 TABLET | Refills: 0 | Status: SHIPPED | OUTPATIENT
Start: 2020-09-22 | End: 2021-10-22 | Stop reason: SDUPTHER

## 2020-09-22 RX ORDER — TIZANIDINE 4 MG/1
6 TABLET ORAL EVERY EVENING
Qty: 45 TABLET | Refills: 3 | Status: SHIPPED | OUTPATIENT
Start: 2020-09-22 | End: 2021-01-20

## 2020-09-22 NOTE — TELEPHONE ENCOUNTER
Requested Prescriptions     Pending Prescriptions Disp Refills    tiZANidine (ZANAFLEX) 4 MG tablet 45 tablet 3     Sig: Take 1.5 tablets by mouth every evening    modafinil (PROVIGIL) 100 MG tablet 30 tablet 0     Sig: Take 1 tablet by mouth daily for 30 days.

## 2021-05-12 DIAGNOSIS — R41.3 MEMORY LOSS: ICD-10-CM

## 2021-05-12 RX ORDER — ESZOPICLONE 2 MG/1
2 TABLET, FILM COATED ORAL NIGHTLY
Qty: 12 TABLET | Refills: 0 | Status: SHIPPED | OUTPATIENT
Start: 2021-05-12 | End: 2021-05-24

## 2021-05-24 ENCOUNTER — OFFICE VISIT (OUTPATIENT)
Dept: NEUROLOGY | Age: 46
End: 2021-05-24
Payer: COMMERCIAL

## 2021-05-24 VITALS
WEIGHT: 233.3 LBS | OXYGEN SATURATION: 96 % | BODY MASS INDEX: 35.47 KG/M2 | HEART RATE: 109 BPM | DIASTOLIC BLOOD PRESSURE: 82 MMHG | SYSTOLIC BLOOD PRESSURE: 130 MMHG

## 2021-05-24 DIAGNOSIS — R41.3 AMNESIA: ICD-10-CM

## 2021-05-24 DIAGNOSIS — G47.01 INSOMNIA DUE TO MEDICAL CONDITION: ICD-10-CM

## 2021-05-24 DIAGNOSIS — G47.419 NARCOLEPSY WITHOUT CATAPLEXY: Primary | ICD-10-CM

## 2021-05-24 DIAGNOSIS — M54.2 CERVICALGIA: ICD-10-CM

## 2021-05-24 DIAGNOSIS — F51.4 NIGHT TERRORS: ICD-10-CM

## 2021-05-24 PROCEDURE — 1036F TOBACCO NON-USER: CPT | Performed by: PSYCHIATRY & NEUROLOGY

## 2021-05-24 PROCEDURE — G8417 CALC BMI ABV UP PARAM F/U: HCPCS | Performed by: PSYCHIATRY & NEUROLOGY

## 2021-05-24 PROCEDURE — G8427 DOCREV CUR MEDS BY ELIG CLIN: HCPCS | Performed by: PSYCHIATRY & NEUROLOGY

## 2021-05-24 PROCEDURE — 99214 OFFICE O/P EST MOD 30 MIN: CPT | Performed by: PSYCHIATRY & NEUROLOGY

## 2021-05-24 RX ORDER — TIZANIDINE 4 MG/1
TABLET ORAL
Qty: 45 TABLET | Refills: 3 | Status: SHIPPED | OUTPATIENT
Start: 2021-05-24 | End: 2021-10-12 | Stop reason: SDUPTHER

## 2021-05-24 RX ORDER — MODAFINIL 100 MG/1
100 TABLET ORAL DAILY
Qty: 30 TABLET | Refills: 0 | Status: SHIPPED | OUTPATIENT
Start: 2021-05-24 | End: 2021-06-23

## 2021-05-24 RX ORDER — TIZANIDINE 4 MG/1
4 TABLET ORAL EVERY 6 HOURS PRN
COMMUNITY
End: 2021-05-24 | Stop reason: SDUPTHER

## 2021-05-24 RX ORDER — MODAFINIL 100 MG/1
TABLET ORAL
COMMUNITY
Start: 2020-09-02 | End: 2021-05-24 | Stop reason: SDUPTHER

## 2021-05-24 RX ORDER — SOLRIAMFETOL 75 MG/1
75 TABLET, FILM COATED ORAL
COMMUNITY
End: 2021-07-26

## 2021-05-24 ASSESSMENT — ENCOUNTER SYMPTOMS
COLOR CHANGE: 0
BACK PAIN: 0
NAUSEA: 0
SHORTNESS OF BREATH: 0
CHOKING: 0
VOMITING: 0
TROUBLE SWALLOWING: 0
PHOTOPHOBIA: 0

## 2021-05-24 NOTE — PROGRESS NOTES
the Last Year:    [address] in the Last Year:    Transportation Needs:     Lack of Transportation (Medical):  Lack of Transportation (Non-Medical):    Physical Activity:     Days of Exercise per Week:     Minutes of Exercise per Session:    Stress:     Feeling of Stress :    Social Connections:     Frequency of Communication with Friends and Family:     Frequency of Social Gatherings with Friends and Family:     Attends Jain Services:     Active Member of Clubs or Organizations:     Attends Club or Organization Meetings:     Marital Status:    Intimate Partner Violence:     Fear of Current or Ex-Partner:     Emotionally Abused:     Physically Abused:     Sexually Abused:      Family History   Problem Relation Age of Onset    Thyroid Cancer Mother     Diabetes Other     Alzheimer's Disease Other      No Known Allergies    Current Outpatient Medications   Medication Sig Dispense Refill    Solriamfetol HCl (SUNOSI) 75 MG TABS Take 75 mg by mouth Take 1 tab daily.  tiZANidine (ZANAFLEX) 4 MG tablet One and half qhs 45 tablet 3    modafinil (PROVIGIL) 100 MG tablet Take 1 tablet by mouth daily for 30 days. 30 tablet 0    eszopiclone (LUNESTA) 2 MG TABS Take 1 tablet by mouth nightly for 12 days. 12 tablet 0    promethazine-dextromethorphan (PROMETHAZINE-DM) 6.25-15 MG/5ML syrup Take 5 mLs by mouth 4 times daily as needed for Cough 1 Bottle 0    diphenhydrAMINE HCl (BENADRYL ALLERGY PO) Take by mouth daily Indications: for Poison Ivy      fluticasone (FLONASE) 50 MCG/ACT nasal spray 1 spray by Nasal route daily 1 Bottle 0    ondansetron (ZOFRAN) 4 MG tablet Take 4 mg by mouth every 8 hours as needed for Nausea or Vomiting      cetirizine (ZYRTEC) 10 MG tablet Take 1 tablet by mouth daily 30 tablet 3    therapeutic multivitamin-minerals (THERAGRAN-M) tablet Take 1 tablet by mouth daily.       traZODone (DESYREL) 50 MG tablet Take 1 tablet by mouth nightly (Patient not taking: Reported on 5/24/2021) 90 tablet 1     No current facility-administered medications for this visit. Review of Systems   Constitutional: Negative for fever. HENT: Negative for ear pain, tinnitus and trouble swallowing. Eyes: Negative for photophobia and visual disturbance. Respiratory: Negative for choking and shortness of breath. Cardiovascular: Negative for chest pain and palpitations. Gastrointestinal: Negative for nausea and vomiting. Musculoskeletal: Negative for back pain, gait problem, joint swelling, myalgias, neck pain and neck stiffness. Skin: Negative for color change. Allergic/Immunologic: Negative for food allergies. Neurological: Negative for dizziness, tremors, seizures, syncope, facial asymmetry, speech difficulty, weakness, light-headedness, numbness and headaches. Psychiatric/Behavioral: Negative for behavioral problems, confusion, hallucinations and sleep disturbance. Objective:   /82 (Site: Left Upper Arm, Position: Sitting, Cuff Size: Medium Adult)   Pulse 109   Wt 233 lb 4.8 oz (105.8 kg)   SpO2 96%   BMI 35.47 kg/m²     Physical Exam  Vitals reviewed. Eyes:      Pupils: Pupils are equal, round, and reactive to light. Cardiovascular:      Rate and Rhythm: Normal rate and regular rhythm. Heart sounds: No murmur heard. Pulmonary:      Effort: Pulmonary effort is normal.      Breath sounds: Normal breath sounds. Abdominal:      General: Bowel sounds are normal.   Musculoskeletal:         General: Normal range of motion. Cervical back: Normal range of motion. Skin:     General: Skin is warm. Neurological:      Mental Status: He is alert and oriented to person, place, and time. Cranial Nerves: No cranial nerve deficit. Sensory: No sensory deficit. Motor: No abnormal muscle tone. Coordination: Coordination normal.      Deep Tendon Reflexes: Reflexes are normal and symmetric.  Babinski sign absent on the right side. Babinski sign absent on the left side. Psychiatric:         Mood and Affect: Mood normal.         MRI Brain W WO Contrast    Result Date: 2020  Patient MRN: [id number] : 1975 Age:  38 years Gender: Male Order Date: 2020 8:38 AM. Exam: MRI BRAIN W WO CONTRAST Number of Views: 492 Indication:  Memory loss, vertigo, blurry vision. Tumors. Comparison: MRI of the brain 1/10/2012. Findings: No evidence of restricted diffusion or acute/subacute cerebrovascular infarction/accident. Pituitary gland and sellar/suprasellar regions are unremarkable. No Chiari malformation. . Hippocampal formations are symmetric in signal intensity and morphology bilaterally. No intrinsic noncontrast T1 shortening. Mild cerebral volume loss/atrophy. No subfalcine, transtentorial or tonsillar herniation or shift. Normal expected appearance the visualized T2 flow voids. Optic globes and orbital contents are within normal limits. Visualized paranasal sinuses and mastoid air cells are free from fluid or mass. . No extra-axial fluid collection or hematoma. No intraparenchymal hemorrhage. Postcontrast imaging demonstrates no evidence of enhancing mass, malignancy or arteriovenous malformation. No dural venous sinus thrombosis or occlusion. Impression:  1. No evidence of restricted diffusion, acute/subacute cerebrovascular infarction/accident, enhancing mass/malignancy arteriovenous malformation. 2. Mild cerebral volume loss/atrophy.  3. Otherwise, unremarkable pre and postcontrast examination of the brain       Lab Results   Component Value Date    WBC 6.2 2019    RBC 4.82 2019    HGB 14.5 2019    HCT 40.8 2019    MCV 84.6 2019    MCH 30.0 2019    MCHC 35.4 2019    RDW 13.7 2019     2019    MPV 7.2 2013     Lab Results   Component Value Date     2019    K 4.0 2019     2019    CO2 26 2019    BUN 16 2019    CREATININE 1.37 07/03/2019    GFRAA >60.0 07/03/2019    LABGLOM 56.3 07/03/2019    GLUCOSE 142 07/03/2019    PROT 7.4 07/03/2019    LABALBU 4.6 07/03/2019    CALCIUM 9.5 07/03/2019    BILITOT 0.7 07/03/2019    ALKPHOS 51 07/03/2019    AST 25 07/03/2019    ALT 26 07/03/2019     No results found for: PROTIME, INR  No results found for: TSH, [id number], FOLATE, FERRITIN, IRON, TIBC, PTRFSAT, TSH, FREET4  No results found for: TRIG, HDL, LDLCALC, LDLDIRECT, LABVLDL  No results found for: LABAMPH, BARBSCNU, LABBENZ, CANNAB, COCAINESCRN, LABMETH, OPIATESCREENURINE, PHENCYCLIDINESCREENURINE, PPXUR, ETOH  No results found for: LITHIUM, DILFRTOT, VALPROATE    Assessment:       Diagnosis Orders   1. Narcolepsy without cataplexy  modafinil (PROVIGIL) 100 MG tablet   2. Insomnia due to medical condition     3. Amnesia     4. Night terrors     5. Cervicalgia       Narcolepsy without cataplexy. We have tried him on multiple medications though he has not responded to this this or side effects. He was on Provigil though he still thinks that Provigil was better if he had a night sleep. We then tried him on Lunesta which helped him for some time. We have made some changes as Lunesta may cause long-term amnesia and I have discontinued this and start him on tizanidine at night at 1-1/2 tablets which help him sleep due to his back pain and neck pain and once he sleeps better he may be able to stay awake with just a single dose of Provigil. We will try this for a few days and see how it helps. He had side effects of Sunosi recommended that he not take this with the medications. Fede Christensen MD, [facility], American Board of Psychiatry & Neurology  Board Certified in Vascular Neurology  Board Certified in Neuromuscular Medicine  Certified in [facility]:      No orders of the defined types were placed in this encounter.     Orders Placed This Encounter   Medications    tiZANidine (ZANAFLEX) 4 MG tablet     Sig: One and half qhs     Dispense:  45 tablet     Refill:  3    modafinil (PROVIGIL) 100 MG tablet     Sig: Take 1 tablet by mouth daily for 30 days. Dispense:  30 tablet     Refill:  0       No follow-ups on file.       Wali Núñez MD

## 2021-07-26 ENCOUNTER — OFFICE VISIT (OUTPATIENT)
Dept: NEUROLOGY | Age: 46
End: 2021-07-26
Payer: COMMERCIAL

## 2021-07-26 VITALS
DIASTOLIC BLOOD PRESSURE: 72 MMHG | WEIGHT: 235 LBS | HEART RATE: 100 BPM | BODY MASS INDEX: 35.73 KG/M2 | SYSTOLIC BLOOD PRESSURE: 118 MMHG

## 2021-07-26 DIAGNOSIS — F51.4 NIGHT TERRORS: ICD-10-CM

## 2021-07-26 DIAGNOSIS — R41.3 AMNESIA: ICD-10-CM

## 2021-07-26 DIAGNOSIS — M54.2 CERVICALGIA: ICD-10-CM

## 2021-07-26 DIAGNOSIS — G47.419 NARCOLEPSY WITHOUT CATAPLEXY: Primary | ICD-10-CM

## 2021-07-26 PROCEDURE — G8417 CALC BMI ABV UP PARAM F/U: HCPCS | Performed by: PSYCHIATRY & NEUROLOGY

## 2021-07-26 PROCEDURE — 1036F TOBACCO NON-USER: CPT | Performed by: PSYCHIATRY & NEUROLOGY

## 2021-07-26 PROCEDURE — G8427 DOCREV CUR MEDS BY ELIG CLIN: HCPCS | Performed by: PSYCHIATRY & NEUROLOGY

## 2021-07-26 PROCEDURE — 99213 OFFICE O/P EST LOW 20 MIN: CPT | Performed by: PSYCHIATRY & NEUROLOGY

## 2021-07-26 ASSESSMENT — ENCOUNTER SYMPTOMS
PHOTOPHOBIA: 0
VOMITING: 0
NAUSEA: 0
SHORTNESS OF BREATH: 0
BACK PAIN: 0
CHOKING: 0
COLOR CHANGE: 0
TROUBLE SWALLOWING: 0
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
## 2021-10-12 RX ORDER — TIZANIDINE 4 MG/1
TABLET ORAL
Qty: 45 TABLET | Refills: 3 | Status: SHIPPED | OUTPATIENT
Start: 2021-10-12 | End: 2022-06-02 | Stop reason: SDUPTHER

## 2021-10-12 NOTE — TELEPHONE ENCOUNTER
Requested Prescriptions     Pending Prescriptions Disp Refills    tiZANidine (ZANAFLEX) 4 MG tablet 45 tablet 3     Sig: One and half qhs

## 2021-10-21 DIAGNOSIS — G47.419 NARCOLEPSY WITHOUT CATAPLEXY: ICD-10-CM

## 2021-10-22 RX ORDER — MODAFINIL 100 MG/1
100 TABLET ORAL DAILY
Qty: 30 TABLET | Refills: 0 | Status: SHIPPED | OUTPATIENT
Start: 2021-10-22 | End: 2021-11-21

## 2022-01-24 PROBLEM — R51.9 HEADACHE: Status: ACTIVE | Noted: 2020-06-15

## 2022-01-24 PROBLEM — F43.10 POSTTRAUMATIC STRESS DISORDER: Status: ACTIVE | Noted: 2022-01-24

## 2022-01-24 PROBLEM — G47.30 SLEEP APNEA: Status: ACTIVE | Noted: 2022-01-24

## 2022-01-24 PROBLEM — M79.673 FOOT PAIN: Status: ACTIVE | Noted: 2022-01-24

## 2022-01-24 PROBLEM — G47.00 INSOMNIA: Status: ACTIVE | Noted: 2020-03-12

## 2022-01-26 ENCOUNTER — OFFICE VISIT (OUTPATIENT)
Dept: NEUROLOGY | Age: 47
End: 2022-01-26
Payer: COMMERCIAL

## 2022-01-26 VITALS
WEIGHT: 246 LBS | DIASTOLIC BLOOD PRESSURE: 80 MMHG | HEART RATE: 99 BPM | BODY MASS INDEX: 37.4 KG/M2 | SYSTOLIC BLOOD PRESSURE: 110 MMHG

## 2022-01-26 DIAGNOSIS — G44.86 CERVICOGENIC HEADACHE: ICD-10-CM

## 2022-01-26 DIAGNOSIS — F51.4 NIGHT TERRORS: ICD-10-CM

## 2022-01-26 DIAGNOSIS — G47.31 PRIMARY CENTRAL SLEEP APNEA: ICD-10-CM

## 2022-01-26 DIAGNOSIS — G47.419 NARCOLEPSY WITHOUT CATAPLEXY: Primary | ICD-10-CM

## 2022-01-26 DIAGNOSIS — F51.01 PRIMARY INSOMNIA: ICD-10-CM

## 2022-01-26 DIAGNOSIS — F43.10 POSTTRAUMATIC STRESS DISORDER: ICD-10-CM

## 2022-01-26 PROCEDURE — 1036F TOBACCO NON-USER: CPT | Performed by: PSYCHIATRY & NEUROLOGY

## 2022-01-26 PROCEDURE — G8417 CALC BMI ABV UP PARAM F/U: HCPCS | Performed by: PSYCHIATRY & NEUROLOGY

## 2022-01-26 PROCEDURE — G8427 DOCREV CUR MEDS BY ELIG CLIN: HCPCS | Performed by: PSYCHIATRY & NEUROLOGY

## 2022-01-26 PROCEDURE — 99214 OFFICE O/P EST MOD 30 MIN: CPT | Performed by: PSYCHIATRY & NEUROLOGY

## 2022-01-26 PROCEDURE — G8484 FLU IMMUNIZE NO ADMIN: HCPCS | Performed by: PSYCHIATRY & NEUROLOGY
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
## 2022-01-31 ASSESSMENT — ENCOUNTER SYMPTOMS
PHOTOPHOBIA: 0
SHORTNESS OF BREATH: 0
NAUSEA: 0
TROUBLE SWALLOWING: 0
VOMITING: 0
BACK PAIN: 0
CHOKING: 0
COLOR CHANGE: 0

## 2022-03-30 ENCOUNTER — OFFICE VISIT (OUTPATIENT)
Dept: FAMILY MEDICINE CLINIC | Age: 47
End: 2022-03-30
Payer: COMMERCIAL

## 2022-03-30 VITALS
OXYGEN SATURATION: 98 % | WEIGHT: 243.4 LBS | HEART RATE: 78 BPM | TEMPERATURE: 97.6 F | DIASTOLIC BLOOD PRESSURE: 86 MMHG | HEIGHT: 68 IN | SYSTOLIC BLOOD PRESSURE: 134 MMHG | BODY MASS INDEX: 36.89 KG/M2

## 2022-03-30 DIAGNOSIS — K21.9 GASTROESOPHAGEAL REFLUX DISEASE WITHOUT ESOPHAGITIS: ICD-10-CM

## 2022-03-30 DIAGNOSIS — G47.31 PRIMARY CENTRAL SLEEP APNEA: Primary | ICD-10-CM

## 2022-03-30 PROCEDURE — 99213 OFFICE O/P EST LOW 20 MIN: CPT | Performed by: FAMILY MEDICINE

## 2022-03-30 PROCEDURE — G8427 DOCREV CUR MEDS BY ELIG CLIN: HCPCS | Performed by: FAMILY MEDICINE

## 2022-03-30 PROCEDURE — G8484 FLU IMMUNIZE NO ADMIN: HCPCS | Performed by: FAMILY MEDICINE

## 2022-03-30 PROCEDURE — G8417 CALC BMI ABV UP PARAM F/U: HCPCS | Performed by: FAMILY MEDICINE

## 2022-03-30 PROCEDURE — 1036F TOBACCO NON-USER: CPT | Performed by: FAMILY MEDICINE

## 2022-03-30 RX ORDER — FLUTICASONE PROPIONATE 50 MCG
1 SPRAY, SUSPENSION (ML) NASAL DAILY
Qty: 16 G | Refills: 5 | Status: SHIPPED | OUTPATIENT
Start: 2022-03-30 | End: 2022-06-02 | Stop reason: SDUPTHER

## 2022-03-30 RX ORDER — FLUTICASONE PROPIONATE 50 MCG
1 SPRAY, SUSPENSION (ML) NASAL DAILY
COMMUNITY
End: 2022-03-30 | Stop reason: SDUPTHER

## 2022-03-30 RX ORDER — ESOMEPRAZOLE MAGNESIUM 40 MG/1
40 CAPSULE, DELAYED RELEASE ORAL DAILY
Qty: 90 CAPSULE | Refills: 3 | Status: SHIPPED | OUTPATIENT
Start: 2022-03-30

## 2022-03-30 SDOH — ECONOMIC STABILITY: FOOD INSECURITY: WITHIN THE PAST 12 MONTHS, THE FOOD YOU BOUGHT JUST DIDN'T LAST AND YOU DIDN'T HAVE MONEY TO GET MORE.: NEVER TRUE

## 2022-03-30 SDOH — ECONOMIC STABILITY: TRANSPORTATION INSECURITY
IN THE PAST 12 MONTHS, HAS THE LACK OF TRANSPORTATION KEPT YOU FROM MEDICAL APPOINTMENTS OR FROM GETTING MEDICATIONS?: NO

## 2022-03-30 SDOH — ECONOMIC STABILITY: FOOD INSECURITY: WITHIN THE PAST 12 MONTHS, YOU WORRIED THAT YOUR FOOD WOULD RUN OUT BEFORE YOU GOT MONEY TO BUY MORE.: NEVER TRUE

## 2022-03-30 SDOH — ECONOMIC STABILITY: TRANSPORTATION INSECURITY
IN THE PAST 12 MONTHS, HAS LACK OF TRANSPORTATION KEPT YOU FROM MEETINGS, WORK, OR FROM GETTING THINGS NEEDED FOR DAILY LIVING?: NO

## 2022-03-30 ASSESSMENT — ENCOUNTER SYMPTOMS
EYES NEGATIVE: 1
RESPIRATORY NEGATIVE: 1
SHORTNESS OF BREATH: 0
ALLERGIC/IMMUNOLOGIC NEGATIVE: 1
NAUSEA: 1

## 2022-03-30 ASSESSMENT — PATIENT HEALTH QUESTIONNAIRE - PHQ9
SUM OF ALL RESPONSES TO PHQ QUESTIONS 1-9: 2
1. LITTLE INTEREST OR PLEASURE IN DOING THINGS: 1
2. FEELING DOWN, DEPRESSED OR HOPELESS: 1
SUM OF ALL RESPONSES TO PHQ9 QUESTIONS 1 & 2: 2

## 2022-03-30 ASSESSMENT — SOCIAL DETERMINANTS OF HEALTH (SDOH): HOW HARD IS IT FOR YOU TO PAY FOR THE VERY BASICS LIKE FOOD, HOUSING, MEDICAL CARE, AND HEATING?: NOT HARD AT ALL

## 2022-03-30 NOTE — PROGRESS NOTES
Patient is seen in follow up for   Chief Complaint   Patient presents with    Heartburn     Patient presents today c/o heartburn x 1 month. Patient states the heartburn is worse at night. HPIhere for follow up on heart burn. Past Medical History:   Diagnosis Date    Abdominal pain     Allergic rhinitis     Enterocolitis     Obstructive sleep apnea     PTSD (post-traumatic stress disorder)     Served in [employer] [id number]     Patient Active Problem List    Diagnosis Date Noted    Foot pain 01/24/2022    Posttraumatic stress disorder 01/24/2022    Sleep apnea 01/24/2022    Headache 06/15/2020    Night terrors 05/04/2020    Narcolepsy without cataplexy 03/12/2020    Insomnia 03/12/2020    Amnesia 11/30/2019    Dizziness 11/30/2019     No past surgical history on file. Family History   Problem Relation Age of Onset    Thyroid Cancer Mother     Diabetes Other     Alzheimer's Disease Other      Social History     Socioeconomic History    Marital status:      Spouse name: None    Number of children: None    Years of education: None    Highest education level: None   Occupational History    None   Tobacco Use    Smoking status: Never Smoker    Smokeless tobacco: Never Used   Substance and Sexual Activity    Alcohol use: Yes     Comment: occasional    Drug use: No    Sexual activity: None   Other Topics Concern    None   Social History Narrative    None     Social Determinants of Health     Financial Resource Strain: Low Risk     Difficulty of Paying Living Expenses: Not hard at all   Food Insecurity: No Food Insecurity    Worried About Running Out of Food in the Last Year: Never true    James of Food in the Last Year: Never true   Transportation Needs: No Transportation Needs    Lack of Transportation (Medical): No    Lack of Transportation (Non-Medical):  No   Physical Activity:     Days of Exercise per Week: Not on file    Minutes of Exercise per Session: Not on file Stress:     Feeling of Stress : Not on file   Social Connections:     Frequency of Communication with Friends and Family: Not on file    Frequency of Social Gatherings with Friends and Family: Not on file    Attends Anabaptism Services: Not on file    Active Member of Clubs or Organizations: Not on file    Attends Club or Organization Meetings: Not on file    Marital Status: Not on file   Intimate Partner Violence:     Fear of Current or Ex-Partner: Not on file    Emotionally Abused: Not on file    Physically Abused: Not on file    Sexually Abused: Not on file   Housing Stability:     Unable to Pay for Housing in the Last Year: Not on file    Number of [place] in the Last Year: Not on file    Unstable Housing in the Last Year: Not on file     Current Outpatient Medications   Medication Sig Dispense Refill    fluticasone (FLONASE) 50 MCG/ACT nasal spray 1 spray by Each Nostril route daily 16 g 5    esomeprazole (NEXIUM) 40 MG delayed release capsule Take 1 capsule by mouth daily 90 capsule 3    tiZANidine (ZANAFLEX) 4 MG tablet One and half qhs 45 tablet 3    Modafinil (PROVIGIL PO) Take by mouth      cetirizine (ZYRTEC) 10 MG tablet Take 1 tablet by mouth daily 30 tablet 3    therapeutic multivitamin-minerals (THERAGRAN-M) tablet Take 1 tablet by mouth daily. No current facility-administered medications for this visit. Current Outpatient Medications on File Prior to Visit   Medication Sig Dispense Refill    tiZANidine (ZANAFLEX) 4 MG tablet One and half qhs 45 tablet 3    Modafinil (PROVIGIL PO) Take by mouth      cetirizine (ZYRTEC) 10 MG tablet Take 1 tablet by mouth daily 30 tablet 3    therapeutic multivitamin-minerals (THERAGRAN-M) tablet Take 1 tablet by mouth daily. No current facility-administered medications on file prior to visit.      No Known Allergies  Health Maintenance   Topic Date Due    Hepatitis C screen  Never done    COVID-19 Vaccine (1) Never done is soft. There is no mass. Tenderness: There is no abdominal tenderness. There is no guarding or rebound. Hernia: No hernia is present. Genitourinary:     Penis: Normal.    Musculoskeletal:         General: No tenderness. Normal range of motion. Cervical back: Normal range of motion and neck supple. Lymphadenopathy:      Cervical: No cervical adenopathy. Skin:     General: Skin is warm and dry. Neurological:      Mental Status: He is alert and oriented to person, place, and time. Cranial Nerves: No cranial nerve deficit. Coordination: Coordination normal.         Assessment   Diagnosis Orders   1. Primary central sleep apnea     2. Gastroesophageal reflux disease without esophagitis       Problem List     Sleep apnea - Primary          Plan  No orders of the defined types were placed in this encounter. Orders Placed This Encounter   Medications    fluticasone (FLONASE) 50 MCG/ACT nasal spray     Si spray by Each Nostril route daily     Dispense:  16 g     Refill:  5    esomeprazole (NEXIUM) 40 MG delayed release capsule     Sig: Take 1 capsule by mouth daily     Dispense:  90 capsule     Refill:  3     No follow-ups on file.   Antony Remy MD

## 2022-06-02 RX ORDER — FLUTICASONE PROPIONATE 50 MCG
1 SPRAY, SUSPENSION (ML) NASAL DAILY
Qty: 16 G | Refills: 5 | Status: SHIPPED | OUTPATIENT
Start: 2022-06-02

## 2022-06-02 RX ORDER — TIZANIDINE 4 MG/1
TABLET ORAL
Qty: 45 TABLET | Refills: 3 | Status: SHIPPED | OUTPATIENT
Start: 2022-06-02

## 2022-07-25 PROBLEM — G44.86 CERVICOGENIC HEADACHE: Status: ACTIVE | Noted: 2020-06-15

## 2022-07-27 ENCOUNTER — OFFICE VISIT (OUTPATIENT)
Dept: NEUROLOGY | Age: 47
End: 2022-07-27
Payer: COMMERCIAL

## 2022-07-27 VITALS
SYSTOLIC BLOOD PRESSURE: 120 MMHG | DIASTOLIC BLOOD PRESSURE: 82 MMHG | HEIGHT: 68 IN | HEART RATE: 67 BPM | WEIGHT: 237 LBS | BODY MASS INDEX: 35.92 KG/M2

## 2022-07-27 DIAGNOSIS — G44.86 CERVICOGENIC HEADACHE: ICD-10-CM

## 2022-07-27 DIAGNOSIS — F51.01 PRIMARY INSOMNIA: ICD-10-CM

## 2022-07-27 DIAGNOSIS — G47.31 PRIMARY CENTRAL SLEEP APNEA: Primary | ICD-10-CM

## 2022-07-27 DIAGNOSIS — F51.4 NIGHT TERRORS: ICD-10-CM

## 2022-07-27 DIAGNOSIS — G45.0 VBI (VERTEBROBASILAR INSUFFICIENCY): ICD-10-CM

## 2022-07-27 PROCEDURE — G8417 CALC BMI ABV UP PARAM F/U: HCPCS | Performed by: PSYCHIATRY & NEUROLOGY

## 2022-07-27 PROCEDURE — 99214 OFFICE O/P EST MOD 30 MIN: CPT | Performed by: PSYCHIATRY & NEUROLOGY

## 2022-07-27 PROCEDURE — G8427 DOCREV CUR MEDS BY ELIG CLIN: HCPCS | Performed by: PSYCHIATRY & NEUROLOGY

## 2022-07-27 PROCEDURE — 1036F TOBACCO NON-USER: CPT | Performed by: PSYCHIATRY & NEUROLOGY

## 2022-07-27 RX ORDER — MODAFINIL 100 MG/1
100 TABLET ORAL DAILY
Qty: 30 TABLET | Refills: 0 | Status: SHIPPED | OUTPATIENT
Start: 2022-07-27 | End: 2022-08-26

## 2022-07-27 ASSESSMENT — ENCOUNTER SYMPTOMS
PHOTOPHOBIA: 0
SHORTNESS OF BREATH: 0
BACK PAIN: 0
CHOKING: 0
VOMITING: 0
TROUBLE SWALLOWING: 0
NAUSEA: 0
COLOR CHANGE: 0
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
## 2022-12-13 ENCOUNTER — OFFICE VISIT (OUTPATIENT)
Dept: FAMILY MEDICINE CLINIC | Age: 47
End: 2022-12-13
Payer: COMMERCIAL

## 2022-12-13 VITALS
SYSTOLIC BLOOD PRESSURE: 124 MMHG | HEIGHT: 68 IN | BODY MASS INDEX: 35.92 KG/M2 | HEART RATE: 104 BPM | OXYGEN SATURATION: 96 % | TEMPERATURE: 97.4 F | WEIGHT: 237 LBS | RESPIRATION RATE: 14 BRPM | DIASTOLIC BLOOD PRESSURE: 88 MMHG

## 2022-12-13 DIAGNOSIS — U07.1 COVID-19 VIRUS INFECTION: Primary | ICD-10-CM

## 2022-12-13 DIAGNOSIS — J06.9 UPPER RESPIRATORY INFECTION WITH COUGH AND CONGESTION: ICD-10-CM

## 2022-12-13 LAB
INFLUENZA A ANTIBODY: NEGATIVE
INFLUENZA B ANTIBODY: NEGATIVE
Lab: ABNORMAL
PERFORMING INSTRUMENT: ABNORMAL
QC PASS/FAIL: ABNORMAL
SARS-COV-2, POC: DETECTED

## 2022-12-13 PROCEDURE — 87426 SARSCOV CORONAVIRUS AG IA: CPT | Performed by: PHYSICIAN ASSISTANT

## 2022-12-13 PROCEDURE — 87804 INFLUENZA ASSAY W/OPTIC: CPT | Performed by: PHYSICIAN ASSISTANT

## 2022-12-13 PROCEDURE — G8484 FLU IMMUNIZE NO ADMIN: HCPCS | Performed by: PHYSICIAN ASSISTANT

## 2022-12-13 PROCEDURE — G8417 CALC BMI ABV UP PARAM F/U: HCPCS | Performed by: PHYSICIAN ASSISTANT

## 2022-12-13 PROCEDURE — 1036F TOBACCO NON-USER: CPT | Performed by: PHYSICIAN ASSISTANT

## 2022-12-13 PROCEDURE — G8427 DOCREV CUR MEDS BY ELIG CLIN: HCPCS | Performed by: PHYSICIAN ASSISTANT

## 2022-12-13 PROCEDURE — 99213 OFFICE O/P EST LOW 20 MIN: CPT | Performed by: PHYSICIAN ASSISTANT

## 2022-12-13 ASSESSMENT — ENCOUNTER SYMPTOMS
SINUS PRESSURE: 0
DIARRHEA: 0
SORE THROAT: 1
BACK PAIN: 0
CHEST TIGHTNESS: 0
VOMITING: 0
COUGH: 1
TROUBLE SWALLOWING: 0
SHORTNESS OF BREATH: 0
ABDOMINAL PAIN: 0

## 2022-12-13 ASSESSMENT — PATIENT HEALTH QUESTIONNAIRE - PHQ9
SUM OF ALL RESPONSES TO PHQ QUESTIONS 1-9: 0
1. LITTLE INTEREST OR PLEASURE IN DOING THINGS: 0
SUM OF ALL RESPONSES TO PHQ9 QUESTIONS 1 & 2: 0
SUM OF ALL RESPONSES TO PHQ QUESTIONS 1-9: 0
2. FEELING DOWN, DEPRESSED OR HOPELESS: 0

## 2022-12-13 NOTE — PROGRESS NOTES
Subjective:      Patient ID: Staci Felder is a 51 y.o. male who presents today for:  Chief Complaint   Patient presents with    Cough     Patient presents today with a cough, sinus congestion, fever, fatigue, muscle aches, headache and sore throat since Wednesday. HPI  51year old male who presents with complaints of cough, congestion, fever, and sore throat for the past 3 days    Past Medical History:   Diagnosis Date    Abdominal pain     Allergic rhinitis     Enterocolitis     Obstructive sleep apnea     PTSD (post-traumatic stress disorder)     Served in [employer] [id number]     No past surgical history on file. Social History     Socioeconomic History    Marital status:      Spouse name: Not on file    Number of children: Not on file    Years of education: Not on file    Highest education level: Not on file   Occupational History    Not on file   Tobacco Use    Smoking status: Never    Smokeless tobacco: Never   Substance and Sexual Activity    Alcohol use: Yes     Comment: occasional    Drug use: No    Sexual activity: Not on file   Other Topics Concern    Not on file   Social History Narrative    Not on file     Social Determinants of Health     Financial Resource Strain: Low Risk     Difficulty of Paying Living Expenses: Not hard at all   Food Insecurity: No Food Insecurity    Worried About [address] [facility] in the Last Year: Never true    [address] [facility] [address] in the Last Year: Never true   Transportation Needs: No Transportation Needs    Lack of Transportation (Medical): No    Lack of Transportation (Non-Medical):  No   Physical Activity: Not on file   Stress: Not on file   Social Connections: Not on file   Intimate Partner Violence: Not on file   Housing Stability: Not on file     Family History   Problem Relation Age of Onset    Thyroid Cancer Mother     Diabetes Other     Alzheimer's Disease Other      No Known Allergies  Current Outpatient Medications   Medication Sig Dispense Refill    tiZANidine (ZANAFLEX) 4 MG tablet One and half qhs 45 tablet 3    fluticasone (FLONASE) 50 MCG/ACT nasal spray 1 spray by Each Nostril route daily 16 g 5    esomeprazole (NEXIUM) 40 MG delayed release capsule Take 1 capsule by mouth daily 90 capsule 3    cetirizine (ZYRTEC) 10 MG tablet Take 1 tablet by mouth daily 30 tablet 3    therapeutic multivitamin-minerals (THERAGRAN-M) tablet Take 1 tablet by mouth daily. No current facility-administered medications for this visit. Review of Systems   Constitutional:  Positive for fever. Negative for activity change, appetite change, chills and unexpected weight change. HENT:  Positive for congestion and sore throat. Negative for drooling, ear pain, nosebleeds, sinus pressure and trouble swallowing. Respiratory:  Positive for cough. Negative for chest tightness and shortness of breath. Cardiovascular:  Negative for chest pain and leg swelling. Gastrointestinal:  Negative for abdominal pain, diarrhea and vomiting. Endocrine: Negative for polydipsia and polyphagia. Genitourinary:  Negative for dysuria, flank pain and frequency. Musculoskeletal:  Negative for back pain and myalgias. Skin:  Negative for pallor and rash. Neurological:  Positive for headaches. Negative for syncope and weakness. Hematological:  Does not bruise/bleed easily. Psychiatric/Behavioral:  Negative for agitation, behavioral problems and confusion. All other systems reviewed and are negative. Objective:   /88 (Site: Left Upper Arm, Position: Sitting, Cuff Size: Large Adult)   Pulse (!) 104   Temp 97.4 °F (36.3 °C) (Temporal)   Resp 14   Ht 5' 8\" (1.727 m)   Wt 237 lb (107.5 kg)   SpO2 96%   BMI 36.04 kg/m²     Physical Exam  Vitals and nursing note reviewed. Constitutional:       General: He is awake. He is not in acute distress. Appearance: Normal appearance. He is well-developed and normal weight. He is not ill-appearing, toxic-appearing or diaphoretic. Comments: No photophobia. No phonophobia. HENT:      Head: Normocephalic and atraumatic. No Schneider's sign. Right Ear: External ear normal.      Left Ear: External ear normal.      Nose: Nose normal. No congestion or rhinorrhea. Mouth/Throat:      Mouth: Mucous membranes are moist.      Pharynx: Oropharynx is clear. No oropharyngeal exudate or posterior oropharyngeal erythema. Eyes:      General: No scleral icterus. Right eye: No foreign body or discharge. Left eye: No discharge. Extraocular Movements: Extraocular movements intact. Conjunctiva/sclera: Conjunctivae normal.      Left eye: No exudate. Pupils: Pupils are equal, round, and reactive to light. Neck:      Vascular: No JVD. Trachea: No tracheal deviation. Comments: No meningismus. Cardiovascular:      Rate and Rhythm: Normal rate and regular rhythm. Heart sounds: Normal heart sounds. Heart sounds not distant. No murmur heard. No friction rub. No gallop. Pulmonary:      Effort: Pulmonary effort is normal. No respiratory distress. Breath sounds: Normal breath sounds. No stridor. No wheezing, rhonchi or rales. Chest:      Chest wall: No tenderness. Abdominal:      General: Abdomen is flat. Bowel sounds are normal. There is no distension. Palpations: Abdomen is soft. There is no mass. Tenderness: There is no abdominal tenderness. There is no right CVA tenderness, left CVA tenderness, guarding or rebound. Hernia: No hernia is present. Musculoskeletal:         General: No swelling, tenderness, deformity or signs of injury. Normal range of motion. Cervical back: Normal range of motion and neck supple. No rigidity. Lymphadenopathy:      Head:      Right side of head: No submental adenopathy. Left side of head: No submental adenopathy. Skin:     General: Skin is warm and dry. Capillary Refill: Capillary refill takes less than 2 seconds.       Coloration: Skin is not jaundiced or pale. Findings: No bruising, erythema, lesion or rash. Neurological:      General: No focal deficit present. Mental Status: He is alert and oriented to person, place, and time. Mental status is at baseline. Cranial Nerves: No cranial nerve deficit. Sensory: No sensory deficit. Motor: No weakness. Coordination: Coordination normal.      Deep Tendon Reflexes: Reflexes are normal and symmetric. Psychiatric:         Mood and Affect: Mood normal.         Behavior: Behavior normal. Behavior is cooperative. Thought Content: Thought content normal.         Judgment: Judgment normal.       Assessment:       Diagnosis Orders   1. COVID-19 virus infection        2. Upper respiratory infection with cough and congestion  POCT COVID-19, Antigen    POCT Influenza A/B        Results for POC orders placed in visit on 12/13/22   POCT Influenza A/B   Result Value Ref Range    Influenza A Ab NEGATIVE     Influenza B Ab NEGATIVE       Plan:     Assessment & Plan   [place] Gildardo was seen today for cough. Diagnoses and all orders for this visit:    COVID-19 virus infection    Upper respiratory infection with cough and congestion  -     POCT COVID-19, Antigen  -     POCT Influenza A/B    Orders Placed This Encounter   Procedures    POCT COVID-19, Antigen     Order Specific Question:   Is this test for diagnosis or screening? Answer:   Screening     Order Specific Question:   Symptomatic for COVID-19 as defined by CDC? Answer:   No     Order Specific Question:   Date of Symptom Onset     Answer:   N/A     Order Specific Question:   Hospitalized for COVID-19? Answer:   No     Order Specific Question:   Admitted to ICU for COVID-19? Answer:   No     Order Specific Question:   Employed in healthcare setting? Answer:   No     Order Specific Question:   Resident in a congregate (group) care setting?      Answer:   No     Order Specific Question:   Previously tested for COVID-19? Answer:   Unknown     Order Specific Question:   Pregnant: Answer:   No    POCT Influenza A/B     No orders of the defined types were placed in this encounter. There are no discontinued medications. Return if symptoms worsen or fail to improve. Reviewed with the patient/family: current clinical status & medications. Side effects of the medication prescribed today, as well as treatment plan/rationale and result expectations have been discussed with the patient/family who expresses understanding. Patient will be discharged home in stable condition. Follow up with PCP to evaluate treatment results or return if symptoms worsen or fail to improve. Discussed signs and symptoms which require immediate follow-up in ED/call to 911. Understanding verbalized. I have reviewed the patient's medical history in detail and updated the computerized patient record.     DAVID Ashton

## 2022-12-13 NOTE — LETTER
[facility]  [address] [address]  Phone: [phone]  Fax: [address], [place]        December 13, 2022     Patient: Leydi Huang   YOB: 1975   Date of Visit: 12/13/2022       To Whom it May Concern:    Leydi Huang was seen in my clinic on 12/13/2022. He was diagnose with Covid and may return to work on Monday. 12/19/2022. If you have any questions or concerns, please don't hesitate to call.     Sincerely,         DAVID Byrne

## 2022-12-15 DIAGNOSIS — U07.1 COVID-19: Primary | ICD-10-CM

## 2022-12-15 RX ORDER — DEXTROMETHORPHAN HYDROBROMIDE AND PROMETHAZINE HYDROCHLORIDE 15; 6.25 MG/5ML; MG/5ML
5 SYRUP ORAL 4 TIMES DAILY PRN
Qty: 150 ML | Refills: 0 | Status: SHIPPED | OUTPATIENT
Start: 2022-12-15 | End: 2022-12-22

## 2022-12-21 ENCOUNTER — OFFICE VISIT (OUTPATIENT)
Dept: FAMILY MEDICINE CLINIC | Age: 47
End: 2022-12-21
Payer: COMMERCIAL

## 2022-12-21 VITALS
HEART RATE: 60 BPM | HEIGHT: 68 IN | WEIGHT: 237 LBS | TEMPERATURE: 97.5 F | DIASTOLIC BLOOD PRESSURE: 82 MMHG | BODY MASS INDEX: 35.92 KG/M2 | SYSTOLIC BLOOD PRESSURE: 115 MMHG | OXYGEN SATURATION: 95 %

## 2022-12-21 DIAGNOSIS — G47.33 OSA (OBSTRUCTIVE SLEEP APNEA): ICD-10-CM

## 2022-12-21 DIAGNOSIS — R06.83 SNORING: ICD-10-CM

## 2022-12-21 DIAGNOSIS — Z12.5 SCREENING PSA (PROSTATE SPECIFIC ANTIGEN): ICD-10-CM

## 2022-12-21 DIAGNOSIS — Z23 NEED FOR INFLUENZA VACCINATION: ICD-10-CM

## 2022-12-21 DIAGNOSIS — Z13.220 LIPID SCREENING: ICD-10-CM

## 2022-12-21 DIAGNOSIS — Z00.00 ANNUAL PHYSICAL EXAM: Primary | ICD-10-CM

## 2022-12-21 PROCEDURE — 90471 IMMUNIZATION ADMIN: CPT | Performed by: FAMILY MEDICINE

## 2022-12-21 PROCEDURE — 90674 CCIIV4 VAC NO PRSV 0.5 ML IM: CPT | Performed by: FAMILY MEDICINE

## 2022-12-21 PROCEDURE — 99396 PREV VISIT EST AGE 40-64: CPT | Performed by: FAMILY MEDICINE

## 2022-12-21 PROCEDURE — G8482 FLU IMMUNIZE ORDER/ADMIN: HCPCS | Performed by: FAMILY MEDICINE

## 2022-12-21 RX ORDER — BENZONATATE 200 MG/1
200 CAPSULE ORAL 3 TIMES DAILY PRN
Qty: 30 CAPSULE | Refills: 0 | Status: SHIPPED | OUTPATIENT
Start: 2022-12-21 | End: 2022-12-28

## 2022-12-21 RX ORDER — FLUTICASONE PROPIONATE 50 MCG
1 SPRAY, SUSPENSION (ML) NASAL DAILY
Qty: 16 G | Refills: 5 | Status: SHIPPED | OUTPATIENT
Start: 2022-12-21

## 2022-12-21 ASSESSMENT — ENCOUNTER SYMPTOMS
RESPIRATORY NEGATIVE: 1
GASTROINTESTINAL NEGATIVE: 1
EYES NEGATIVE: 1
ALLERGIC/IMMUNOLOGIC NEGATIVE: 1
SHORTNESS OF BREATH: 0

## 2022-12-21 NOTE — PROGRESS NOTES
Vaccine Information Sheet, \"Influenza - Inactivated\"  given to Emmanuelle Morirs, or parent/legal guardian of  Emmanuelle Morris and verbalized understanding. Patient responses:    Have you ever had a reaction to a flu vaccine? No  Are you able to eat eggs without adverse effects? Yes  Do you have any current illness? 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 Please see immunization tab.

## 2022-12-21 NOTE — PROGRESS NOTES
Patient is seen in follow up for   Chief Complaint   Patient presents with    Annual Exam     Pt presents today for annual exam.    Flu Vaccine     Pt wants flu vacc. HPIhere for annual exam feeling still with cough from recent viral infection. Past Medical History:   Diagnosis Date    Abdominal pain     Allergic rhinitis     Enterocolitis     Obstructive sleep apnea     PTSD (post-traumatic stress disorder)     Served in [facility] [phone]     Patient Active Problem List    Diagnosis Date Noted    COVID-19 virus infection 12/13/2022    Upper respiratory infection with cough and congestion 12/13/2022    VBI (vertebrobasilar insufficiency) 07/27/2022    Foot pain 01/24/2022    Posttraumatic stress disorder 01/24/2022    Sleep apnea 01/24/2022    Cervicogenic headache 06/15/2020    Night terrors 05/04/2020    Narcolepsy without cataplexy 03/12/2020    Insomnia 03/12/2020    Amnesia 11/30/2019    Dizziness 11/30/2019     No past surgical history on file. Family History   Problem Relation Age of Onset    Thyroid Cancer Mother     Diabetes Other     Alzheimer's Disease Other      Social History     Socioeconomic History    Marital status:      Spouse name: None    Number of children: None    Years of education: None    Highest education level: None   Tobacco Use    Smoking status: Never    Smokeless tobacco: Never   Substance and Sexual Activity    Alcohol use: Yes     Comment: occasional    Drug use: No     Social Determinants of Health     Financial Resource Strain: Low Risk     Difficulty of Paying Living Expenses: Not hard at all   Food Insecurity: No Food Insecurity    Worried About [facility] in the Last Year: Never true    Ran Out of Food in the Last Year: Never true   Transportation Needs: No Transportation Needs    Lack of Transportation (Medical): No    Lack of Transportation (Non-Medical):  No     Current Outpatient Medications   Medication Sig Dispense Refill promethazine-dextromethorphan (PROMETHAZINE-DM) 6.25-15 MG/5ML syrup Take 5 mLs by mouth 4 times daily as needed for Cough 150 mL 0    tiZANidine (ZANAFLEX) 4 MG tablet One and half qhs 45 tablet 3    fluticasone (FLONASE) 50 MCG/ACT nasal spray 1 spray by Each Nostril route daily 16 g 5    esomeprazole (NEXIUM) 40 MG delayed release capsule Take 1 capsule by mouth daily 90 capsule 3    cetirizine (ZYRTEC) 10 MG tablet Take 1 tablet by mouth daily 30 tablet 3    therapeutic multivitamin-minerals (THERAGRAN-M) tablet Take 1 tablet by mouth daily. No current facility-administered medications for this visit. Current Outpatient Medications on File Prior to Visit   Medication Sig Dispense Refill    promethazine-dextromethorphan (PROMETHAZINE-DM) 6.25-15 MG/5ML syrup Take 5 mLs by mouth 4 times daily as needed for Cough 150 mL 0    tiZANidine (ZANAFLEX) 4 MG tablet One and half qhs 45 tablet 3    fluticasone (FLONASE) 50 MCG/ACT nasal spray 1 spray by Each Nostril route daily 16 g 5    esomeprazole (NEXIUM) 40 MG delayed release capsule Take 1 capsule by mouth daily 90 capsule 3    cetirizine (ZYRTEC) 10 MG tablet Take 1 tablet by mouth daily 30 tablet 3    therapeutic multivitamin-minerals (THERAGRAN-M) tablet Take 1 tablet by mouth daily. No current facility-administered medications on file prior to visit.      No Known Allergies  Health Maintenance   Topic Date Due    COVID-19 Vaccine (1) Never done    HIV screen  Never done    Hepatitis C screen  Never done    DTaP/Tdap/Td vaccine (1 - Tdap) Never done    Diabetes screen  Never done    Lipids  Never done    Colorectal Cancer Screen  Never done    Depression Screen  12/13/2023    Flu vaccine  Completed    Hepatitis A vaccine  Aged Out    Hib vaccine  Aged Out    Meningococcal (ACWY) vaccine  Aged Out    Pneumococcal 0-64 years Vaccine  Aged Out       Review of Systems     Review of Systems   Constitutional:  Negative for activity change, appetite change, chills, fever and unexpected weight change. HENT: Negative. Eyes: Negative. Respiratory: Negative. Negative for shortness of breath. Cardiovascular: Negative. Negative for chest pain and palpitations. Gastrointestinal: Negative. Endocrine: Negative. Genitourinary: Negative. Musculoskeletal: Negative. Skin: Negative. Allergic/Immunologic: Negative. Neurological: Negative. Hematological: Negative. Psychiatric/Behavioral: Negative. Physical Exam  Vitals:    12/21/22 1056   BP: 115/82   Site: Left Upper Arm   Position: Sitting   Cuff Size: Large Adult   Pulse: 60   Temp: 97.5 °F (36.4 °C)   TempSrc: Temporal   SpO2: 95%   Weight: 237 lb (107.5 kg)   Height: 5' 8\" (1.727 m)       Physical Exam  Constitutional:       Appearance: He is well-developed. HENT:      Right Ear: External ear normal.      Left Ear: External ear normal.   Eyes:      Conjunctiva/sclera: Conjunctivae normal.      Pupils: Pupils are equal, round, and reactive to light. Neck:      Thyroid: No thyromegaly. Cardiovascular:      Rate and Rhythm: Normal rate and regular rhythm. Heart sounds: Normal heart sounds. No murmur heard. No friction rub. No gallop. Pulmonary:      Effort: Pulmonary effort is normal. No respiratory distress. Breath sounds: Normal breath sounds. No wheezing. Abdominal:      General: Bowel sounds are normal. There is no distension. Palpations: Abdomen is soft. There is no mass. Tenderness: There is no abdominal tenderness. There is no guarding or rebound. Hernia: No hernia is present. Genitourinary:     Penis: Normal.    Musculoskeletal:         General: No tenderness. Normal range of motion. Cervical back: Normal range of motion and neck supple. Lymphadenopathy:      Cervical: No cervical adenopathy. Skin:     General: Skin is warm and dry. Neurological:      Mental Status: He is alert and oriented to person, place, and time. Cranial Nerves: No cranial nerve deficit. Coordination: Coordination normal.       Assessment   Diagnosis Orders   1. Annual physical exam        2. Lipid screening        3. Screening PSA (prostate specific antigen)        4. Need for influenza vaccination  Influenza, FLUCELVAX, (age 10 mo+), IM, Preservative Free, 0.5 mL      5. Snoring          Problem List    None    Plan  Orders Placed This Encounter   Procedures    Influenza, FLUCELVAX, (age 10 mo+), IM, Preservative Free, 0.5 mL     No orders of the defined types were placed in this encounter. No follow-ups on file.   Genesis Cabrera MD

## 2023-07-21 ENCOUNTER — OFFICE VISIT (OUTPATIENT)
Dept: FAMILY MEDICINE CLINIC | Age: 48
End: 2023-07-21

## 2023-07-21 VITALS
RESPIRATION RATE: 17 BRPM | HEIGHT: 68 IN | DIASTOLIC BLOOD PRESSURE: 80 MMHG | WEIGHT: 240 LBS | SYSTOLIC BLOOD PRESSURE: 132 MMHG | OXYGEN SATURATION: 98 % | HEART RATE: 88 BPM | BODY MASS INDEX: 36.37 KG/M2

## 2023-07-21 DIAGNOSIS — Z12.11 COLON CANCER SCREENING: ICD-10-CM

## 2023-07-21 DIAGNOSIS — Z13.220 LIPID SCREENING: ICD-10-CM

## 2023-07-21 DIAGNOSIS — Z00.00 ANNUAL PHYSICAL EXAM: Primary | ICD-10-CM

## 2023-07-21 SDOH — ECONOMIC STABILITY: HOUSING INSECURITY
IN THE LAST 12 MONTHS, WAS THERE A TIME WHEN YOU DID NOT HAVE A STEADY PLACE TO SLEEP OR SLEPT IN A SHELTER (INCLUDING NOW)?: NO

## 2023-07-21 SDOH — ECONOMIC STABILITY: INCOME INSECURITY: HOW HARD IS IT FOR YOU TO PAY FOR THE VERY BASICS LIKE FOOD, HOUSING, MEDICAL CARE, AND HEATING?: NOT HARD AT ALL

## 2023-07-21 SDOH — ECONOMIC STABILITY: FOOD INSECURITY: WITHIN THE PAST 12 MONTHS, YOU WORRIED THAT YOUR FOOD WOULD RUN OUT BEFORE YOU GOT MONEY TO BUY MORE.: NEVER TRUE

## 2023-07-21 SDOH — ECONOMIC STABILITY: FOOD INSECURITY: WITHIN THE PAST 12 MONTHS, THE FOOD YOU BOUGHT JUST DIDN'T LAST AND YOU DIDN'T HAVE MONEY TO GET MORE.: NEVER TRUE

## 2023-07-21 ASSESSMENT — ENCOUNTER SYMPTOMS
SHORTNESS OF BREATH: 0
EYES NEGATIVE: 1
RESPIRATORY NEGATIVE: 1
GASTROINTESTINAL NEGATIVE: 1
ALLERGIC/IMMUNOLOGIC NEGATIVE: 1

## 2023-07-21 ASSESSMENT — PATIENT HEALTH QUESTIONNAIRE - PHQ9
2. FEELING DOWN, DEPRESSED OR HOPELESS: 0
1. LITTLE INTEREST OR PLEASURE IN DOING THINGS: 0
SUM OF ALL RESPONSES TO PHQ QUESTIONS 1-9: 0
SUM OF ALL RESPONSES TO PHQ9 QUESTIONS 1 & 2: 0
SUM OF ALL RESPONSES TO PHQ QUESTIONS 1-9: 0

## 2023-07-21 NOTE — PROGRESS NOTES
Metabolic Panel     Standing Status:   Future     Standing Expiration Date:   7/20/2024    CBC with Auto Differential     Standing Status:   Future     Standing Expiration Date:   7/20/2024    Consuelo Salinas MD, Gastroenterology, [facility]     Referral Priority:   Routine     Referral Type:   Eval and Treat     Referral Reason:   Specialty Services Required     Referred to Provider:   Maite Silva MD     Requested Specialty:   Gastroenterology     Number of Visits Requested:   1     No orders of the defined types were placed in this encounter. No follow-ups on file.   Chris Garcia MD

## 2023-08-23 ENCOUNTER — E-VISIT (OUTPATIENT)
Dept: PRIMARY CARE CLINIC | Age: 48
End: 2023-08-23
Payer: COMMERCIAL

## 2023-08-23 DIAGNOSIS — H00.014 HORDEOLUM EXTERNUM OF LEFT UPPER EYELID: Primary | ICD-10-CM

## 2023-08-23 PROCEDURE — 99422 OL DIG E/M SVC 11-20 MIN: CPT | Performed by: NURSE PRACTITIONER

## 2023-08-23 RX ORDER — ERYTHROMYCIN 5 MG/G
OINTMENT OPHTHALMIC
Qty: 1 G | Refills: 0 | Status: SHIPPED | OUTPATIENT
Start: 2023-08-23

## 2023-08-23 NOTE — PROGRESS NOTES
Mary Larkin (1975) initiated an asynchronous digital communication through [employer]. HPI: per patient questionnaire     Exam: not applicable    Diagnoses and all orders for this visit:  Diagnoses and all orders for this visit:    Hordeolum externum of left upper eyelid    Other orders  -     erythromycin (ROMYCIN) 5 MG/GM ophthalmic ointment; 1/2 inch ribbon to affected eye 4 times daily for 5-7 days. Photos reviewed  Appears to be a possible stye. Recommend warm compresses. We will send an antibiotic eye ointment. Encourage follow-up with PCP      Time: EV2 - 11-20 minutes were spent on the digital evaluation and management of this patient.  16 min    MANAV Greene - CNP

## 2024-07-30 ASSESSMENT — PATIENT HEALTH QUESTIONNAIRE - PHQ9
SUM OF ALL RESPONSES TO PHQ QUESTIONS 1-9: 0
SUM OF ALL RESPONSES TO PHQ QUESTIONS 1-9: 0
1. LITTLE INTEREST OR PLEASURE IN DOING THINGS: NOT AT ALL
1. LITTLE INTEREST OR PLEASURE IN DOING THINGS: NOT AT ALL
SUM OF ALL RESPONSES TO PHQ QUESTIONS 1-9: 0
SUM OF ALL RESPONSES TO PHQ QUESTIONS 1-9: 0
SUM OF ALL RESPONSES TO PHQ9 QUESTIONS 1 & 2: 0
2. FEELING DOWN, DEPRESSED OR HOPELESS: NOT AT ALL
SUM OF ALL RESPONSES TO PHQ9 QUESTIONS 1 & 2: 0
2. FEELING DOWN, DEPRESSED OR HOPELESS: NOT AT ALL

## 2024-08-02 ENCOUNTER — OFFICE VISIT (OUTPATIENT)
Dept: FAMILY MEDICINE CLINIC | Age: 49
End: 2024-08-02
Payer: COMMERCIAL

## 2024-08-02 VITALS
OXYGEN SATURATION: 95 % | HEIGHT: 68 IN | RESPIRATION RATE: 17 BRPM | BODY MASS INDEX: 38.04 KG/M2 | DIASTOLIC BLOOD PRESSURE: 78 MMHG | WEIGHT: 251 LBS | HEART RATE: 100 BPM | SYSTOLIC BLOOD PRESSURE: 122 MMHG

## 2024-08-02 DIAGNOSIS — Z13.220 LIPID SCREENING: ICD-10-CM

## 2024-08-02 DIAGNOSIS — G47.31 PRIMARY CENTRAL SLEEP APNEA: ICD-10-CM

## 2024-08-02 DIAGNOSIS — Z12.11 COLON CANCER SCREENING: ICD-10-CM

## 2024-08-02 DIAGNOSIS — Z00.00 ANNUAL PHYSICAL EXAM: Primary | ICD-10-CM

## 2024-08-02 DIAGNOSIS — R23.2 HOT FLASHES: ICD-10-CM

## 2024-08-02 PROCEDURE — 99396 PREV VISIT EST AGE 40-64: CPT | Performed by: FAMILY MEDICINE

## 2024-08-02 RX ORDER — ESOMEPRAZOLE MAGNESIUM 40 MG/1
40 CAPSULE, DELAYED RELEASE ORAL DAILY
Qty: 90 CAPSULE | Refills: 3 | Status: SHIPPED | OUTPATIENT
Start: 2024-08-02

## 2024-08-02 RX ORDER — TIZANIDINE 4 MG/1
TABLET ORAL
Qty: 45 TABLET | Refills: 3 | Status: SHIPPED | OUTPATIENT
Start: 2024-08-02

## 2024-08-02 RX ORDER — FLUTICASONE PROPIONATE 50 MCG
1 SPRAY, SUSPENSION (ML) NASAL DAILY
Qty: 16 G | Refills: 5 | Status: SHIPPED | OUTPATIENT
Start: 2024-08-02

## 2024-08-02 ASSESSMENT — ENCOUNTER SYMPTOMS
RESPIRATORY NEGATIVE: 1
SHORTNESS OF BREATH: 0
GASTROINTESTINAL NEGATIVE: 1
EYES NEGATIVE: 1
ALLERGIC/IMMUNOLOGIC NEGATIVE: 1

## 2024-08-02 NOTE — PROGRESS NOTES
Patient is seen in follow up for   Chief Complaint   Patient presents with    Annual Exam     HPIhere for annual physical feeling well.    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  Family History   Problem Relation Age of Onset    Thyroid Cancer Mother     Diabetes Other     Alzheimer's Disease Other      Social History     Socioeconomic History    Marital status:    Tobacco Use    Smoking status: Never    Smokeless tobacco: Never   Substance and Sexual Activity    Alcohol use: Yes     Comment: occasional    Drug use: No     Social Determinants of Health     Financial Resource Strain: Low Risk  (7/21/2023)    Overall Financial Resource Strain (CARDIA)     Difficulty of Paying Living Expenses: Not hard at all   Transportation Needs: Unknown (7/21/2023)    PRAPARE - Transportation     Lack of Transportation (Non-Medical): No   Housing Stability: Unknown (7/21/2023)    Housing Stability Vital Sign     Unstable Housing in the Last Year: No     Current Outpatient Medications   Medication Sig Dispense Refill    esomeprazole (NEXIUM) 40 MG delayed release capsule Take 1 capsule by mouth daily 90 capsule 3    fluticasone (FLONASE) 50 MCG/ACT nasal spray 1 spray by Each Nostril route daily 16 g 5    tiZANidine (ZANAFLEX) 4 MG tablet One and half qhs 45

## 2025-02-18 ENCOUNTER — OFFICE VISIT (OUTPATIENT)
Dept: FAMILY MEDICINE CLINIC | Age: 50
End: 2025-02-18
Payer: COMMERCIAL

## 2025-02-18 VITALS
OXYGEN SATURATION: 96 % | HEIGHT: 68 IN | BODY MASS INDEX: 36.37 KG/M2 | HEART RATE: 107 BPM | WEIGHT: 240 LBS | TEMPERATURE: 98.4 F

## 2025-02-18 DIAGNOSIS — J06.9 ACUTE URI: ICD-10-CM

## 2025-02-18 DIAGNOSIS — J01.90 ACUTE NON-RECURRENT SINUSITIS, UNSPECIFIED LOCATION: Primary | ICD-10-CM

## 2025-02-18 PROCEDURE — 99213 OFFICE O/P EST LOW 20 MIN: CPT | Performed by: NURSE PRACTITIONER

## 2025-02-18 PROCEDURE — 3017F COLORECTAL CA SCREEN DOC REV: CPT | Performed by: NURSE PRACTITIONER

## 2025-02-18 PROCEDURE — G8427 DOCREV CUR MEDS BY ELIG CLIN: HCPCS | Performed by: NURSE PRACTITIONER

## 2025-02-18 PROCEDURE — G8417 CALC BMI ABV UP PARAM F/U: HCPCS | Performed by: NURSE PRACTITIONER

## 2025-02-18 PROCEDURE — 1036F TOBACCO NON-USER: CPT | Performed by: NURSE PRACTITIONER

## 2025-02-18 RX ORDER — FLUTICASONE PROPIONATE 50 MCG
1 SPRAY, SUSPENSION (ML) NASAL DAILY
Qty: 16 G | Refills: 5 | Status: SHIPPED | OUTPATIENT
Start: 2025-02-18

## 2025-02-18 RX ORDER — PREDNISONE 20 MG/1
40 TABLET ORAL DAILY
Qty: 8 TABLET | Refills: 0 | Status: SHIPPED | OUTPATIENT
Start: 2025-02-18 | End: 2025-02-22

## 2025-02-18 RX ORDER — CETIRIZINE HYDROCHLORIDE, PSEUDOEPHEDRINE HYDROCHLORIDE 5; 120 MG/1; MG/1
1 TABLET, FILM COATED, EXTENDED RELEASE ORAL 2 TIMES DAILY
Qty: 20 TABLET | Refills: 0 | Status: SHIPPED | OUTPATIENT
Start: 2025-02-18 | End: 2025-02-28

## 2025-02-18 SDOH — ECONOMIC STABILITY: FOOD INSECURITY: WITHIN THE PAST 12 MONTHS, YOU WORRIED THAT YOUR FOOD WOULD RUN OUT BEFORE YOU GOT MONEY TO BUY MORE.: NEVER TRUE

## 2025-02-18 SDOH — ECONOMIC STABILITY: FOOD INSECURITY: WITHIN THE PAST 12 MONTHS, THE FOOD YOU BOUGHT JUST DIDN'T LAST AND YOU DIDN'T HAVE MONEY TO GET MORE.: NEVER TRUE

## 2025-02-18 ASSESSMENT — PATIENT HEALTH QUESTIONNAIRE - PHQ9
1. LITTLE INTEREST OR PLEASURE IN DOING THINGS: NOT AT ALL
SUM OF ALL RESPONSES TO PHQ9 QUESTIONS 1 & 2: 0
SUM OF ALL RESPONSES TO PHQ QUESTIONS 1-9: 0
SUM OF ALL RESPONSES TO PHQ QUESTIONS 1-9: 0
2. FEELING DOWN, DEPRESSED OR HOPELESS: NOT AT ALL
SUM OF ALL RESPONSES TO PHQ QUESTIONS 1-9: 0
SUM OF ALL RESPONSES TO PHQ QUESTIONS 1-9: 0

## 2025-02-18 ASSESSMENT — ENCOUNTER SYMPTOMS
COUGH: 1
SORE THROAT: 0
VOMITING: 0
SHORTNESS OF BREATH: 0
WHEEZING: 0
DIARRHEA: 0
RHINORRHEA: 1
NAUSEA: 0
TROUBLE SWALLOWING: 0

## 2025-02-18 NOTE — PROGRESS NOTES
Subjective:      Patient ID: Edwardo Rahman is a 49 y.o. male who presents today for:  Chief Complaint   Patient presents with    Sinus Problem     States he is having sinus problem and a lot of pressure on his forehead and barely sleeping x 4 days Taking musinex and zyrtec but not helping        HPI    Sinus infection since Friday and not breaking up   Taking mucinex zyrtec, ibuprfen   Today is the 5th day   Hard time sleeping beacsue he cannot breath   Kid had the same thing three weeks ago   He gets this annually   Just wont go away on its own   He's out of Flonase   Took firday off   His son had it as well and would not go away after 2 weeks he also had to get an antibiotic.   Exam is good   Pain is tight frontal     Past Medical History:   Diagnosis Date    Abdominal pain     Allergic rhinitis     Enterocolitis     Obstructive sleep apnea     PTSD (post-traumatic stress disorder)     Served in [place] [id number]     History reviewed. No pertinent surgical history.  Social History     Socioeconomic History    Marital status:      Spouse name: Not on file    Number of children: Not on file    Years of education: Not on file    Highest education level: Not on file   Occupational History    Not on file   Tobacco Use    Smoking status: Never    Smokeless tobacco: Never   Substance and Sexual Activity    Alcohol use: Yes     Comment: occasional    Drug use: No    Sexual activity: Not on file   Other Topics Concern    Not on file   Social History Narrative    Not on file     Social Determinants of Health     Financial Resource Strain: Low Risk  (7/21/2023)    Overall Financial Resource Strain (CARDIA)     Difficulty of Paying Living Expenses: Not hard at all   Food Insecurity: No Food Insecurity (2/18/2025)    Hunger Vital Sign     Worried About Running Out of Food in the Last Year: Never true     Ran Out of Food in the Last Year: Never true   Transportation Needs: No Transportation Needs (2/18/2025)    PRAPARE -

## 2025-03-05 DIAGNOSIS — J01.90 ACUTE NON-RECURRENT SINUSITIS, UNSPECIFIED LOCATION: ICD-10-CM

## 2025-03-05 RX ORDER — ESOMEPRAZOLE MAGNESIUM 40 MG/1
40 CAPSULE, DELAYED RELEASE ORAL DAILY
Qty: 90 CAPSULE | Refills: 0 | Status: SHIPPED | OUTPATIENT
Start: 2025-03-05

## 2025-04-23 RX ORDER — FLUTICASONE PROPIONATE 50 MCG
1 SPRAY, SUSPENSION (ML) NASAL DAILY
Qty: 16 G | Refills: 5 | Status: SHIPPED | OUTPATIENT
Start: 2025-04-23

## 2025-04-23 NOTE — TELEPHONE ENCOUNTER
Comments:     Last Office Visit (last PCP visit):   2025    Next Visit Date:  No future appointments.    **If hasn't been seen in over a year OR hasn't followed up according to last diabetes/ADHD visit, make appointment for patient before sending refill to provider.    Rx requested:  Requested Prescriptions     Pending Prescriptions Disp Refills    fluticasone (FLONASE) 50 MCG/ACT nasal spray 16 g 5     Si spray by Each Nostril route daily